# Patient Record
Sex: FEMALE | Race: ASIAN | NOT HISPANIC OR LATINO
[De-identification: names, ages, dates, MRNs, and addresses within clinical notes are randomized per-mention and may not be internally consistent; named-entity substitution may affect disease eponyms.]

---

## 2017-07-05 ENCOUNTER — TRANSCRIPTION ENCOUNTER (OUTPATIENT)
Age: 32
End: 2017-07-05

## 2022-05-16 ENCOUNTER — TRANSCRIPTION ENCOUNTER (OUTPATIENT)
Age: 37
End: 2022-05-16

## 2022-05-16 ENCOUNTER — INPATIENT (INPATIENT)
Facility: HOSPITAL | Age: 37
LOS: 3 days | Discharge: HOME | End: 2022-05-20
Attending: OBSTETRICS & GYNECOLOGY | Admitting: OBSTETRICS & GYNECOLOGY
Payer: COMMERCIAL

## 2022-05-16 VITALS
SYSTOLIC BLOOD PRESSURE: 173 MMHG | DIASTOLIC BLOOD PRESSURE: 93 MMHG | OXYGEN SATURATION: 99 % | TEMPERATURE: 98 F | RESPIRATION RATE: 18 BRPM | HEART RATE: 67 BPM

## 2022-05-16 DIAGNOSIS — Z3A.00 WEEKS OF GESTATION OF PREGNANCY NOT SPECIFIED: ICD-10-CM

## 2022-05-16 DIAGNOSIS — Z34.80 ENCOUNTER FOR SUPERVISION OF OTHER NORMAL PREGNANCY, UNSPECIFIED TRIMESTER: ICD-10-CM

## 2022-05-16 DIAGNOSIS — O26.899 OTHER SPECIFIED PREGNANCY RELATED CONDITIONS, UNSPECIFIED TRIMESTER: ICD-10-CM

## 2022-05-16 LAB
ALBUMIN SERPL ELPH-MCNC: 4.2 G/DL — SIGNIFICANT CHANGE UP (ref 3.3–5)
ALP SERPL-CCNC: 208 U/L — HIGH (ref 40–120)
ALT FLD-CCNC: 27 U/L — SIGNIFICANT CHANGE UP (ref 10–45)
ANION GAP SERPL CALC-SCNC: 14 MMOL/L — SIGNIFICANT CHANGE UP (ref 5–17)
APPEARANCE UR: CLEAR — SIGNIFICANT CHANGE UP
APTT BLD: 30.3 SEC — SIGNIFICANT CHANGE UP (ref 27.5–35.5)
AST SERPL-CCNC: 30 U/L — SIGNIFICANT CHANGE UP (ref 10–40)
BACTERIA # UR AUTO: NEGATIVE — SIGNIFICANT CHANGE UP
BASOPHILS # BLD AUTO: 0.07 K/UL — SIGNIFICANT CHANGE UP (ref 0–0.2)
BASOPHILS NFR BLD AUTO: 0.7 % — SIGNIFICANT CHANGE UP (ref 0–2)
BILIRUB SERPL-MCNC: 0.3 MG/DL — SIGNIFICANT CHANGE UP (ref 0.2–1.2)
BILIRUB UR-MCNC: NEGATIVE — SIGNIFICANT CHANGE UP
BLD GP AB SCN SERPL QL: NEGATIVE — SIGNIFICANT CHANGE UP
BUN SERPL-MCNC: 21 MG/DL — SIGNIFICANT CHANGE UP (ref 7–23)
CALCIUM SERPL-MCNC: 9.6 MG/DL — SIGNIFICANT CHANGE UP (ref 8.4–10.5)
CHLORIDE SERPL-SCNC: 98 MMOL/L — SIGNIFICANT CHANGE UP (ref 96–108)
CO2 SERPL-SCNC: 22 MMOL/L — SIGNIFICANT CHANGE UP (ref 22–31)
COLOR SPEC: COLORLESS — SIGNIFICANT CHANGE UP
CREAT ?TM UR-MCNC: 27 MG/DL — SIGNIFICANT CHANGE UP
CREAT SERPL-MCNC: 0.77 MG/DL — SIGNIFICANT CHANGE UP (ref 0.5–1.3)
DIFF PNL FLD: NEGATIVE — SIGNIFICANT CHANGE UP
EGFR: 103 ML/MIN/1.73M2 — SIGNIFICANT CHANGE UP
EOSINOPHIL # BLD AUTO: 0.07 K/UL — SIGNIFICANT CHANGE UP (ref 0–0.5)
EOSINOPHIL NFR BLD AUTO: 0.7 % — SIGNIFICANT CHANGE UP (ref 0–6)
EPI CELLS # UR: 3 /HPF — SIGNIFICANT CHANGE UP
FIBRINOGEN PPP-MCNC: 551 MG/DL — HIGH (ref 330–520)
GLUCOSE SERPL-MCNC: 81 MG/DL — SIGNIFICANT CHANGE UP (ref 70–99)
GLUCOSE UR QL: NEGATIVE — SIGNIFICANT CHANGE UP
HCT VFR BLD CALC: 38.4 % — SIGNIFICANT CHANGE UP (ref 34.5–45)
HGB BLD-MCNC: 13 G/DL — SIGNIFICANT CHANGE UP (ref 11.5–15.5)
HYALINE CASTS # UR AUTO: 0 /LPF — SIGNIFICANT CHANGE UP (ref 0–2)
IMM GRANULOCYTES NFR BLD AUTO: 0.3 % — SIGNIFICANT CHANGE UP (ref 0–1.5)
INR BLD: 0.83 RATIO — LOW (ref 0.88–1.16)
KETONES UR-MCNC: NEGATIVE — SIGNIFICANT CHANGE UP
LDH SERPL L TO P-CCNC: 245 U/L — HIGH (ref 50–242)
LEUKOCYTE ESTERASE UR-ACNC: ABNORMAL
LYMPHOCYTES # BLD AUTO: 3.86 K/UL — HIGH (ref 1–3.3)
LYMPHOCYTES # BLD AUTO: 37.4 % — SIGNIFICANT CHANGE UP (ref 13–44)
MCHC RBC-ENTMCNC: 33.1 PG — SIGNIFICANT CHANGE UP (ref 27–34)
MCHC RBC-ENTMCNC: 33.9 GM/DL — SIGNIFICANT CHANGE UP (ref 32–36)
MCV RBC AUTO: 97.7 FL — SIGNIFICANT CHANGE UP (ref 80–100)
MONOCYTES # BLD AUTO: 0.76 K/UL — SIGNIFICANT CHANGE UP (ref 0–0.9)
MONOCYTES NFR BLD AUTO: 7.4 % — SIGNIFICANT CHANGE UP (ref 2–14)
NEUTROPHILS # BLD AUTO: 5.54 K/UL — SIGNIFICANT CHANGE UP (ref 1.8–7.4)
NEUTROPHILS NFR BLD AUTO: 53.5 % — SIGNIFICANT CHANGE UP (ref 43–77)
NITRITE UR-MCNC: NEGATIVE — SIGNIFICANT CHANGE UP
NRBC # BLD: 0 /100 WBCS — SIGNIFICANT CHANGE UP (ref 0–0)
PH UR: 6.5 — SIGNIFICANT CHANGE UP (ref 5–8)
PLATELET # BLD AUTO: 202 K/UL — SIGNIFICANT CHANGE UP (ref 150–400)
POTASSIUM SERPL-MCNC: 4.8 MMOL/L — SIGNIFICANT CHANGE UP (ref 3.5–5.3)
POTASSIUM SERPL-SCNC: 4.8 MMOL/L — SIGNIFICANT CHANGE UP (ref 3.5–5.3)
PROT ?TM UR-MCNC: 22 MG/DL — HIGH (ref 0–12)
PROT SERPL-MCNC: 7.8 G/DL — SIGNIFICANT CHANGE UP (ref 6–8.3)
PROT UR-MCNC: ABNORMAL
PROT/CREAT UR-RTO: 0.8 RATIO — HIGH (ref 0–0.2)
PROTHROM AB SERPL-ACNC: 9.5 SEC — LOW (ref 10.5–13.4)
RBC # BLD: 3.93 M/UL — SIGNIFICANT CHANGE UP (ref 3.8–5.2)
RBC # FLD: 12.5 % — SIGNIFICANT CHANGE UP (ref 10.3–14.5)
RBC CASTS # UR COMP ASSIST: 1 /HPF — SIGNIFICANT CHANGE UP (ref 0–4)
RH IG SCN BLD-IMP: POSITIVE — SIGNIFICANT CHANGE UP
SARS-COV-2 RNA SPEC QL NAA+PROBE: SIGNIFICANT CHANGE UP
SODIUM SERPL-SCNC: 134 MMOL/L — LOW (ref 135–145)
SP GR SPEC: 1.01 — LOW (ref 1.01–1.02)
URATE SERPL-MCNC: 6.6 MG/DL — SIGNIFICANT CHANGE UP (ref 2.5–7)
UROBILINOGEN FLD QL: NEGATIVE — SIGNIFICANT CHANGE UP
WBC # BLD: 10.33 K/UL — SIGNIFICANT CHANGE UP (ref 3.8–10.5)
WBC # FLD AUTO: 10.33 K/UL — SIGNIFICANT CHANGE UP (ref 3.8–10.5)
WBC UR QL: 20 /HPF — HIGH (ref 0–5)

## 2022-05-16 RX ORDER — MAGNESIUM SULFATE 500 MG/ML
4 VIAL (ML) INJECTION ONCE
Refills: 0 | Status: COMPLETED | OUTPATIENT
Start: 2022-05-16 | End: 2022-05-16

## 2022-05-16 RX ORDER — CITRIC ACID/SODIUM CITRATE 300-500 MG
30 SOLUTION, ORAL ORAL ONCE
Refills: 0 | Status: DISCONTINUED | OUTPATIENT
Start: 2022-05-16 | End: 2022-05-17

## 2022-05-16 RX ORDER — OXYTOCIN 10 UNIT/ML
333.33 VIAL (ML) INJECTION
Qty: 20 | Refills: 0 | Status: DISCONTINUED | OUTPATIENT
Start: 2022-05-16 | End: 2022-05-17

## 2022-05-16 RX ORDER — MAGNESIUM SULFATE 500 MG/ML
4 VIAL (ML) INJECTION ONCE
Refills: 0 | Status: DISCONTINUED | OUTPATIENT
Start: 2022-05-16 | End: 2022-05-17

## 2022-05-16 RX ORDER — MAGNESIUM SULFATE 500 MG/ML
2 VIAL (ML) INJECTION
Qty: 40 | Refills: 0 | Status: DISCONTINUED | OUTPATIENT
Start: 2022-05-16 | End: 2022-05-17

## 2022-05-16 RX ORDER — NIFEDIPINE 30 MG
30 TABLET, EXTENDED RELEASE 24 HR ORAL DAILY
Refills: 0 | Status: DISCONTINUED | OUTPATIENT
Start: 2022-05-16 | End: 2022-05-20

## 2022-05-16 RX ORDER — SODIUM CHLORIDE 9 MG/ML
1000 INJECTION, SOLUTION INTRAVENOUS
Refills: 0 | Status: DISCONTINUED | OUTPATIENT
Start: 2022-05-16 | End: 2022-05-17

## 2022-05-16 RX ORDER — SODIUM CHLORIDE 9 MG/ML
1000 INJECTION, SOLUTION INTRAVENOUS ONCE
Refills: 0 | Status: COMPLETED | OUTPATIENT
Start: 2022-05-16 | End: 2022-05-16

## 2022-05-16 RX ORDER — LABETALOL HCL 100 MG
20 TABLET ORAL ONCE
Refills: 0 | Status: COMPLETED | OUTPATIENT
Start: 2022-05-16 | End: 2022-05-16

## 2022-05-16 RX ORDER — FAMOTIDINE 10 MG/ML
20 INJECTION INTRAVENOUS ONCE
Refills: 0 | Status: COMPLETED | OUTPATIENT
Start: 2022-05-16 | End: 2022-05-17

## 2022-05-16 RX ADMIN — Medication 20 MILLIGRAM(S): at 20:35

## 2022-05-16 RX ADMIN — Medication 50 GM/HR: at 21:42

## 2022-05-16 RX ADMIN — SODIUM CHLORIDE 2000 MILLILITER(S): 9 INJECTION, SOLUTION INTRAVENOUS at 20:30

## 2022-05-16 RX ADMIN — Medication 300 GRAM(S): at 21:22

## 2022-05-16 RX ADMIN — Medication 30 MILLIGRAM(S): at 21:32

## 2022-05-16 RX ADMIN — Medication 12 MILLIGRAM(S): at 21:47

## 2022-05-16 NOTE — OB PROVIDER H&P - NSHPPHYSICALEXAM_GEN_ALL_CORE
Gen: NAD  Cards: RRR  Pulm: CTAB  Abd: soft, non-tender, gravid  Ext: warm, well perfused    Cat 1 tracing  Cx none

## 2022-05-16 NOTE — OB PROVIDER H&P - HISTORY OF PRESENT ILLNESS
38yo  at 35w3d presents due to severe range BPs. PNC c/b IUGR <1%. Pt denies any BP issues through pregnancy. Today, she presented for routine ultrasound for biweekly BPP when elevated pressures were noted.   On admission patient had sustained BPs in 170s/90s, requiring a push of Lab20 IV. Patient denies chest pressure or pain, shortness of breath, fevers, chills, nausea, vomiting, diarrhea, blurry vision.   Patient reports a mild headache over her left temple that was present earlier this morning but currently no active.   Last sono baby was 1588g 3#8 at 34w. GBS unknown. Breech presentation.    OBHx: TOP 13 years ago, no surgery.  GYNHx: denies fibroids, cysts, abnormal paps, STDs  PMH: denies  PSH: denies  Meds: PNVs  All: NKDA  Soc: no substance use, anxiety/depression    accepts blood

## 2022-05-16 NOTE — OB PROVIDER H&P - ATTENDING COMMENTS
37yo nullip at 35.3, adm for high BP.  No sign PMHx.  APC compl by fetal growth restriction with efw yesterday on 1st%tile, 3'8".  Overnight BPs improved after one IV dose of Labetalol and one oral dose of Procardia. FHRT reassuring. Labs normal. Started on magnesium for seizure ppx, and given steroids for FLM.   As pt was not NPO, decision made to re-eval this morning and if delivery indicated, to plan for C/S for breech.  ISAIAH Shelby

## 2022-05-16 NOTE — OB PROVIDER H&P - ASSESSMENT
36yo  at 35w3d presents meeting criteria for preeclampsia with severe features:    - Admit to L&D for management of severe range blood pressure and surgical planning.  - s/p Lab 20 IV now BPs in 140s/80s. Start Procardia 30XL.  - HELLP labs q6hr + Start Magnesium for seizure ppx  - q6hr eval for mag toxicity  - BMZ for lunch maturity  - Breech presentation-> plan fo urgent  section    Amyeo Afroz Jereen, PGY-1  d/w Dr Shelby 38yo  at 35w3d presents meeting criteria for preeclampsia with severe features:    - Admit to L&D for management of severe range blood pressure and surgical planning.  - s/p Lab 20 IV now BPs in 140s/80s. Start Procardia 30XL.  - HELLP labs q6hr + Start Magnesium for seizure ppx  - q6hr eval for mag toxicity  - BMZ for lung maturity  - Breech presentation-> plan fo urgent  section    Amyeo Afroz Jereen, PGY-1  d/w Dr Shelby 36yo  at 35w3d presents meeting criteria for preeclampsia with severe features:    - Admit to L&D for management of severe range blood pressure and surgical planning.  - s/p Lab 20 IV now BPs in 140s/80s. Start Procardia 30XL.  - HELLP labs q6hr + Start Magnesium for seizure ppx  - q6hr eval for mag toxicity  - BMZ for lung maturity  - Breech presentation-> plan  section    Amyeo Afroz Jereen, PGY-1  d/w Dr Shelby

## 2022-05-17 LAB
ALBUMIN SERPL ELPH-MCNC: 3 G/DL — LOW (ref 3.3–5)
ALBUMIN SERPL ELPH-MCNC: 3.8 G/DL — SIGNIFICANT CHANGE UP (ref 3.3–5)
ALP SERPL-CCNC: 163 U/L — HIGH (ref 40–120)
ALP SERPL-CCNC: 195 U/L — HIGH (ref 40–120)
ALT FLD-CCNC: 25 U/L — SIGNIFICANT CHANGE UP (ref 10–45)
ALT FLD-CCNC: 26 U/L — SIGNIFICANT CHANGE UP (ref 10–45)
ANION GAP SERPL CALC-SCNC: 17 MMOL/L — SIGNIFICANT CHANGE UP (ref 5–17)
ANION GAP SERPL CALC-SCNC: 18 MMOL/L — HIGH (ref 5–17)
APTT BLD: 27.8 SEC — SIGNIFICANT CHANGE UP (ref 27.5–35.5)
APTT BLD: 29.6 SEC — SIGNIFICANT CHANGE UP (ref 27.5–35.5)
AST SERPL-CCNC: 26 U/L — SIGNIFICANT CHANGE UP (ref 10–40)
AST SERPL-CCNC: 27 U/L — SIGNIFICANT CHANGE UP (ref 10–40)
BASOPHILS # BLD AUTO: 0.01 K/UL — SIGNIFICANT CHANGE UP (ref 0–0.2)
BASOPHILS # BLD AUTO: 0.03 K/UL — SIGNIFICANT CHANGE UP (ref 0–0.2)
BASOPHILS NFR BLD AUTO: 0.1 % — SIGNIFICANT CHANGE UP (ref 0–2)
BASOPHILS NFR BLD AUTO: 0.3 % — SIGNIFICANT CHANGE UP (ref 0–2)
BILIRUB SERPL-MCNC: 0.2 MG/DL — SIGNIFICANT CHANGE UP (ref 0.2–1.2)
BILIRUB SERPL-MCNC: 0.2 MG/DL — SIGNIFICANT CHANGE UP (ref 0.2–1.2)
BUN SERPL-MCNC: 17 MG/DL — SIGNIFICANT CHANGE UP (ref 7–23)
BUN SERPL-MCNC: 17 MG/DL — SIGNIFICANT CHANGE UP (ref 7–23)
CALCIUM SERPL-MCNC: 7.4 MG/DL — LOW (ref 8.4–10.5)
CALCIUM SERPL-MCNC: 8.1 MG/DL — LOW (ref 8.4–10.5)
CHLORIDE SERPL-SCNC: 101 MMOL/L — SIGNIFICANT CHANGE UP (ref 96–108)
CHLORIDE SERPL-SCNC: 98 MMOL/L — SIGNIFICANT CHANGE UP (ref 96–108)
CO2 SERPL-SCNC: 16 MMOL/L — LOW (ref 22–31)
CO2 SERPL-SCNC: 19 MMOL/L — LOW (ref 22–31)
COVID-19 SPIKE DOMAIN AB INTERP: POSITIVE
COVID-19 SPIKE DOMAIN AB INTERP: POSITIVE
COVID-19 SPIKE DOMAIN ANTIBODY RESULT: >250 U/ML — HIGH
COVID-19 SPIKE DOMAIN ANTIBODY RESULT: >250 U/ML — HIGH
CREAT SERPL-MCNC: 0.69 MG/DL — SIGNIFICANT CHANGE UP (ref 0.5–1.3)
CREAT SERPL-MCNC: 0.69 MG/DL — SIGNIFICANT CHANGE UP (ref 0.5–1.3)
EGFR: 116 ML/MIN/1.73M2 — SIGNIFICANT CHANGE UP
EGFR: 116 ML/MIN/1.73M2 — SIGNIFICANT CHANGE UP
EOSINOPHIL # BLD AUTO: 0 K/UL — SIGNIFICANT CHANGE UP (ref 0–0.5)
EOSINOPHIL # BLD AUTO: 0 K/UL — SIGNIFICANT CHANGE UP (ref 0–0.5)
EOSINOPHIL NFR BLD AUTO: 0 % — SIGNIFICANT CHANGE UP (ref 0–6)
EOSINOPHIL NFR BLD AUTO: 0 % — SIGNIFICANT CHANGE UP (ref 0–6)
FIBRINOGEN PPP-MCNC: 472 MG/DL — SIGNIFICANT CHANGE UP (ref 330–520)
FIBRINOGEN PPP-MCNC: 518 MG/DL — SIGNIFICANT CHANGE UP (ref 330–520)
GLUCOSE SERPL-MCNC: 133 MG/DL — HIGH (ref 70–99)
GLUCOSE SERPL-MCNC: 172 MG/DL — HIGH (ref 70–99)
HCT VFR BLD CALC: 32.5 % — LOW (ref 34.5–45)
HCT VFR BLD CALC: 35.6 % — SIGNIFICANT CHANGE UP (ref 34.5–45)
HGB BLD-MCNC: 10.8 G/DL — LOW (ref 11.5–15.5)
HGB BLD-MCNC: 12.3 G/DL — SIGNIFICANT CHANGE UP (ref 11.5–15.5)
IMM GRANULOCYTES NFR BLD AUTO: 0.5 % — SIGNIFICANT CHANGE UP (ref 0–1.5)
IMM GRANULOCYTES NFR BLD AUTO: 0.6 % — SIGNIFICANT CHANGE UP (ref 0–1.5)
INR BLD: 0.82 RATIO — LOW (ref 0.88–1.16)
INR BLD: 0.85 RATIO — LOW (ref 0.88–1.16)
LDH SERPL L TO P-CCNC: 194 U/L — SIGNIFICANT CHANGE UP (ref 50–242)
LDH SERPL L TO P-CCNC: 208 U/L — SIGNIFICANT CHANGE UP (ref 50–242)
LYMPHOCYTES # BLD AUTO: 1.26 K/UL — SIGNIFICANT CHANGE UP (ref 1–3.3)
LYMPHOCYTES # BLD AUTO: 1.85 K/UL — SIGNIFICANT CHANGE UP (ref 1–3.3)
LYMPHOCYTES # BLD AUTO: 11.3 % — LOW (ref 13–44)
LYMPHOCYTES # BLD AUTO: 14 % — SIGNIFICANT CHANGE UP (ref 13–44)
MAGNESIUM SERPL-MCNC: 5.8 MG/DL — HIGH (ref 1.6–2.6)
MAGNESIUM SERPL-MCNC: 7 MG/DL — CRITICAL HIGH (ref 1.6–2.6)
MAGNESIUM SERPL-MCNC: 7.4 MG/DL — CRITICAL HIGH (ref 1.6–2.6)
MCHC RBC-ENTMCNC: 33.1 PG — SIGNIFICANT CHANGE UP (ref 27–34)
MCHC RBC-ENTMCNC: 33.2 GM/DL — SIGNIFICANT CHANGE UP (ref 32–36)
MCHC RBC-ENTMCNC: 33.4 PG — SIGNIFICANT CHANGE UP (ref 27–34)
MCHC RBC-ENTMCNC: 34.6 GM/DL — SIGNIFICANT CHANGE UP (ref 32–36)
MCV RBC AUTO: 96.7 FL — SIGNIFICANT CHANGE UP (ref 80–100)
MCV RBC AUTO: 99.7 FL — SIGNIFICANT CHANGE UP (ref 80–100)
MONOCYTES # BLD AUTO: 0.05 K/UL — SIGNIFICANT CHANGE UP (ref 0–0.9)
MONOCYTES # BLD AUTO: 0.46 K/UL — SIGNIFICANT CHANGE UP (ref 0–0.9)
MONOCYTES NFR BLD AUTO: 0.4 % — LOW (ref 2–14)
MONOCYTES NFR BLD AUTO: 3.5 % — SIGNIFICANT CHANGE UP (ref 2–14)
NEUTROPHILS # BLD AUTO: 10.81 K/UL — HIGH (ref 1.8–7.4)
NEUTROPHILS # BLD AUTO: 9.75 K/UL — HIGH (ref 1.8–7.4)
NEUTROPHILS NFR BLD AUTO: 81.9 % — HIGH (ref 43–77)
NEUTROPHILS NFR BLD AUTO: 87.4 % — HIGH (ref 43–77)
NRBC # BLD: 0 /100 WBCS — SIGNIFICANT CHANGE UP (ref 0–0)
NRBC # BLD: 0 /100 WBCS — SIGNIFICANT CHANGE UP (ref 0–0)
PLATELET # BLD AUTO: 175 K/UL — SIGNIFICANT CHANGE UP (ref 150–400)
PLATELET # BLD AUTO: 190 K/UL — SIGNIFICANT CHANGE UP (ref 150–400)
POTASSIUM SERPL-MCNC: 4.4 MMOL/L — SIGNIFICANT CHANGE UP (ref 3.5–5.3)
POTASSIUM SERPL-MCNC: 4.4 MMOL/L — SIGNIFICANT CHANGE UP (ref 3.5–5.3)
POTASSIUM SERPL-SCNC: 4.4 MMOL/L — SIGNIFICANT CHANGE UP (ref 3.5–5.3)
POTASSIUM SERPL-SCNC: 4.4 MMOL/L — SIGNIFICANT CHANGE UP (ref 3.5–5.3)
PROT SERPL-MCNC: 6 G/DL — SIGNIFICANT CHANGE UP (ref 6–8.3)
PROT SERPL-MCNC: 6.9 G/DL — SIGNIFICANT CHANGE UP (ref 6–8.3)
PROTHROM AB SERPL-ACNC: 9.4 SEC — LOW (ref 10.5–13.4)
PROTHROM AB SERPL-ACNC: 9.9 SEC — LOW (ref 10.5–13.4)
RBC # BLD: 3.26 M/UL — LOW (ref 3.8–5.2)
RBC # BLD: 3.68 M/UL — LOW (ref 3.8–5.2)
RBC # FLD: 12.3 % — SIGNIFICANT CHANGE UP (ref 10.3–14.5)
RBC # FLD: 12.7 % — SIGNIFICANT CHANGE UP (ref 10.3–14.5)
SARS-COV-2 IGG+IGM SERPL QL IA: >250 U/ML — HIGH
SARS-COV-2 IGG+IGM SERPL QL IA: >250 U/ML — HIGH
SARS-COV-2 IGG+IGM SERPL QL IA: POSITIVE
SARS-COV-2 IGG+IGM SERPL QL IA: POSITIVE
SODIUM SERPL-SCNC: 134 MMOL/L — LOW (ref 135–145)
SODIUM SERPL-SCNC: 135 MMOL/L — SIGNIFICANT CHANGE UP (ref 135–145)
T PALLIDUM AB TITR SER: NEGATIVE — SIGNIFICANT CHANGE UP
URATE SERPL-MCNC: 7 MG/DL — SIGNIFICANT CHANGE UP (ref 2.5–7)
URATE SERPL-MCNC: 7 MG/DL — SIGNIFICANT CHANGE UP (ref 2.5–7)
WBC # BLD: 11.16 K/UL — HIGH (ref 3.8–10.5)
WBC # BLD: 13.2 K/UL — HIGH (ref 3.8–10.5)
WBC # FLD AUTO: 11.16 K/UL — HIGH (ref 3.8–10.5)
WBC # FLD AUTO: 13.2 K/UL — HIGH (ref 3.8–10.5)

## 2022-05-17 PROCEDURE — 88307 TISSUE EXAM BY PATHOLOGIST: CPT | Mod: 26

## 2022-05-17 RX ORDER — NALOXONE HYDROCHLORIDE 4 MG/.1ML
0.1 SPRAY NASAL
Refills: 0 | Status: DISCONTINUED | OUTPATIENT
Start: 2022-05-17 | End: 2022-05-18

## 2022-05-17 RX ORDER — DIPHENHYDRAMINE HCL 50 MG
25 CAPSULE ORAL EVERY 6 HOURS
Refills: 0 | Status: DISCONTINUED | OUTPATIENT
Start: 2022-05-17 | End: 2022-05-20

## 2022-05-17 RX ORDER — SIMETHICONE 80 MG/1
80 TABLET, CHEWABLE ORAL EVERY 4 HOURS
Refills: 0 | Status: DISCONTINUED | OUTPATIENT
Start: 2022-05-17 | End: 2022-05-20

## 2022-05-17 RX ORDER — TETANUS TOXOID, REDUCED DIPHTHERIA TOXOID AND ACELLULAR PERTUSSIS VACCINE, ADSORBED 5; 2.5; 8; 8; 2.5 [IU]/.5ML; [IU]/.5ML; UG/.5ML; UG/.5ML; UG/.5ML
0.5 SUSPENSION INTRAMUSCULAR ONCE
Refills: 0 | Status: DISCONTINUED | OUTPATIENT
Start: 2022-05-17 | End: 2022-05-20

## 2022-05-17 RX ORDER — KETOROLAC TROMETHAMINE 30 MG/ML
30 SYRINGE (ML) INJECTION EVERY 6 HOURS
Refills: 0 | Status: DISCONTINUED | OUTPATIENT
Start: 2022-05-17 | End: 2022-05-18

## 2022-05-17 RX ORDER — MORPHINE SULFATE 50 MG/1
0.1 CAPSULE, EXTENDED RELEASE ORAL ONCE
Refills: 0 | Status: DISCONTINUED | OUTPATIENT
Start: 2022-05-17 | End: 2022-05-18

## 2022-05-17 RX ORDER — OXYCODONE HYDROCHLORIDE 5 MG/1
5 TABLET ORAL
Refills: 0 | Status: DISCONTINUED | OUTPATIENT
Start: 2022-05-17 | End: 2022-05-18

## 2022-05-17 RX ORDER — NALBUPHINE HYDROCHLORIDE 10 MG/ML
2.5 INJECTION, SOLUTION INTRAMUSCULAR; INTRAVENOUS; SUBCUTANEOUS EVERY 6 HOURS
Refills: 0 | Status: DISCONTINUED | OUTPATIENT
Start: 2022-05-17 | End: 2022-05-18

## 2022-05-17 RX ORDER — CITRIC ACID/SODIUM CITRATE 300-500 MG
15 SOLUTION, ORAL ORAL ONCE
Refills: 0 | Status: COMPLETED | OUTPATIENT
Start: 2022-05-17 | End: 2022-05-17

## 2022-05-17 RX ORDER — MAGNESIUM SULFATE 500 MG/ML
2 VIAL (ML) INJECTION
Qty: 40 | Refills: 0 | Status: DISCONTINUED | OUTPATIENT
Start: 2022-05-17 | End: 2022-05-17

## 2022-05-17 RX ORDER — IBUPROFEN 200 MG
600 TABLET ORAL EVERY 6 HOURS
Refills: 0 | Status: COMPLETED | OUTPATIENT
Start: 2022-05-17 | End: 2023-04-15

## 2022-05-17 RX ORDER — SODIUM CHLORIDE 9 MG/ML
1000 INJECTION, SOLUTION INTRAVENOUS
Refills: 0 | Status: DISCONTINUED | OUTPATIENT
Start: 2022-05-17 | End: 2022-05-18

## 2022-05-17 RX ORDER — ACETAMINOPHEN 500 MG
975 TABLET ORAL
Refills: 0 | Status: DISCONTINUED | OUTPATIENT
Start: 2022-05-17 | End: 2022-05-20

## 2022-05-17 RX ORDER — LANOLIN
1 OINTMENT (GRAM) TOPICAL EVERY 6 HOURS
Refills: 0 | Status: DISCONTINUED | OUTPATIENT
Start: 2022-05-17 | End: 2022-05-20

## 2022-05-17 RX ORDER — OXYCODONE HYDROCHLORIDE 5 MG/1
5 TABLET ORAL
Refills: 0 | Status: COMPLETED | OUTPATIENT
Start: 2022-05-17 | End: 2022-05-24

## 2022-05-17 RX ORDER — ACETAMINOPHEN 500 MG
975 TABLET ORAL ONCE
Refills: 0 | Status: DISCONTINUED | OUTPATIENT
Start: 2022-05-17 | End: 2022-05-20

## 2022-05-17 RX ORDER — OXYTOCIN 10 UNIT/ML
333.33 VIAL (ML) INJECTION
Qty: 20 | Refills: 0 | Status: DISCONTINUED | OUTPATIENT
Start: 2022-05-17 | End: 2022-05-20

## 2022-05-17 RX ORDER — ONDANSETRON 8 MG/1
4 TABLET, FILM COATED ORAL EVERY 6 HOURS
Refills: 0 | Status: COMPLETED | OUTPATIENT
Start: 2022-05-17 | End: 2022-05-18

## 2022-05-17 RX ORDER — OXYCODONE HYDROCHLORIDE 5 MG/1
5 TABLET ORAL ONCE
Refills: 0 | Status: DISCONTINUED | OUTPATIENT
Start: 2022-05-17 | End: 2022-05-20

## 2022-05-17 RX ORDER — HEPARIN SODIUM 5000 [USP'U]/ML
5000 INJECTION INTRAVENOUS; SUBCUTANEOUS EVERY 12 HOURS
Refills: 0 | Status: DISCONTINUED | OUTPATIENT
Start: 2022-05-17 | End: 2022-05-20

## 2022-05-17 RX ORDER — DEXAMETHASONE 0.5 MG/5ML
4 ELIXIR ORAL EVERY 6 HOURS
Refills: 0 | Status: DISCONTINUED | OUTPATIENT
Start: 2022-05-17 | End: 2022-05-18

## 2022-05-17 RX ORDER — MAGNESIUM SULFATE 500 MG/ML
1.5 VIAL (ML) INJECTION
Qty: 40 | Refills: 0 | Status: DISCONTINUED | OUTPATIENT
Start: 2022-05-17 | End: 2022-05-18

## 2022-05-17 RX ORDER — MAGNESIUM HYDROXIDE 400 MG/1
30 TABLET, CHEWABLE ORAL
Refills: 0 | Status: DISCONTINUED | OUTPATIENT
Start: 2022-05-17 | End: 2022-05-20

## 2022-05-17 RX ADMIN — NALBUPHINE HYDROCHLORIDE 2.5 MILLIGRAM(S): 10 INJECTION, SOLUTION INTRAMUSCULAR; INTRAVENOUS; SUBCUTANEOUS at 12:00

## 2022-05-17 RX ADMIN — Medication 975 MILLIGRAM(S): at 21:00

## 2022-05-17 RX ADMIN — Medication 15 MILLILITER(S): at 07:29

## 2022-05-17 RX ADMIN — Medication 975 MILLIGRAM(S): at 22:00

## 2022-05-17 RX ADMIN — Medication 30 MILLIGRAM(S): at 17:32

## 2022-05-17 RX ADMIN — Medication 30 MILLIGRAM(S): at 13:33

## 2022-05-17 RX ADMIN — ONDANSETRON 4 MILLIGRAM(S): 8 TABLET, FILM COATED ORAL at 16:58

## 2022-05-17 RX ADMIN — HEPARIN SODIUM 5000 UNIT(S): 5000 INJECTION INTRAVENOUS; SUBCUTANEOUS at 17:30

## 2022-05-17 RX ADMIN — FAMOTIDINE 20 MILLIGRAM(S): 10 INJECTION INTRAVENOUS at 07:30

## 2022-05-17 RX ADMIN — Medication 30 MILLIGRAM(S): at 23:03

## 2022-05-17 RX ADMIN — Medication 37.5 GM/HR: at 23:03

## 2022-05-17 RX ADMIN — Medication 1000 MILLIUNIT(S)/MIN: at 09:20

## 2022-05-17 RX ADMIN — Medication 4 MILLIGRAM(S): at 19:30

## 2022-05-17 RX ADMIN — Medication 30 MILLIGRAM(S): at 09:42

## 2022-05-17 RX ADMIN — Medication 975 MILLIGRAM(S): at 12:44

## 2022-05-17 NOTE — OB PROVIDER DELIVERY SUMMARY - NSPROVIDERDELIVERYNOTE_OBGYN_ALL_OB_FT
primary LTCS@35w4d for sPEC, uncomplicated  viable female infant, breech presentation, 3lbs 12oz, Apgars 9/9, cord gasses sent  Grossly normal fallopian tubes, uterus. R ovary notable for small simple appearing cyst. Left ovary wnl.   Hysterotomy closed in 1 layer    EBL: 700  QBL: 140  IVF: 1800  UOP: 100    Suha PGY2

## 2022-05-17 NOTE — CHART NOTE - NSCHARTNOTEFT_GEN_A_CORE
HELLP labs on admission reviewed: wnl. P/C 0.8.  Cont q6hr HELLP labs. Pt assessed at 2am, no complaints.  Reflexes 1+, same compared to baseline.    overnight since admission at 9:00pm.    Amyeo Afroz Jereen, PGY-1
Patient is a 36 yo  at 35.4 wks gestational age presenting with elevated blood pressures requiring push of labetalol 20 mg, meeting criteria for sPEC.  She was started on magnesium sulfate for seizure prophylaxis on .  She received her first dose of BMZ on .  Case discussed with MFM on proceeding with immediate delivery due to sPEC vs. awaiting beta complete status.  Indicated immediately delivery, due not defer for steroids.    d/w Dr. Melvin Bentley PGY3
chart and events reviewed  36yo P0 at 35+wks a/w sPEC & IUGR, breech presentation s/p BMZ x1 and on Mg  plan for delivery this am, NPO  stable condition, reassuring fetal status  explained decision to pt and , all questions answered  discussed as well poss need to T incision the uterus or decide to go straight to classical c/s depending on how well developed NEMESIO is/IUGR baby.    they understand in that even need for c/s in the future.  consents obtained  anesthesia and nursing aware  awaiting OR  calluzzo
Mg level @7.4.  VSS.  No signs/symptoms of mg toxicity.     Will restart in 2 hours    Blaine PGY2
Pt evaluated at bedside following serum Mag level 7.0 & ROS notable for dizziness & n/v. Mag was paused followed critical Mag value, pt reports alleviation of symptoms since then. Denies any headache, SOB, CP, or dizziness. VSS as below. Mag to be held for 2h and then restarted at lower rate.    Vital Signs Last 24 Hrs  T(C): 36.4 (17 May 2022 19:00), Max: 37.1 (17 May 2022 03:04)  T(F): 97.6 (17 May 2022 19:00), Max: 98.78 (17 May 2022 03:04)  HR: 72 (17 May 2022 21:00) (62 - 148)  BP: 124/87 (17 May 2022 21:00) (102/55 - 156/93)  BP(mean): 92 (17 May 2022 11:50) (76 - 95)  RR: 18 (17 May 2022 21:00) (16 - 20)  SpO2: 98% (17 May 2022 21:00) (94% - 100%)    Patient status and plan of care discussed with Dr. Huston.     Elena Richardson MD  OBGYN PGY3

## 2022-05-17 NOTE — OB RN INTRAOPERATIVE NOTE - NSSELHIDDEN_OBGYN_ALL_OB_FT
[NS_DeliveryAttending1_OBGYN_ALL_OB_FT:[id number]==],[NS_DeliveryAssist1_OBGYN_ALL_OB_FT:[email or web address]],[NS_DeliveryRN_OBGYN_ALL_OB_FT:[id number]==]

## 2022-05-17 NOTE — PROVIDER CONTACT NOTE (CRITICAL VALUE NOTIFICATION) - SITUATION
C/S day of delivery on mg for PEC. C/o dizziness w/ standing, nausea, 2x episodes of emesis relieved with decadron and zofran admin

## 2022-05-17 NOTE — OB NEONATOLOGY/PEDIATRICIAN DELIVERY SUMMARY - NSPEDSNEONOTESA_OBGYN_ALL_OB_FT
Requested by OB to attend a primary  for Breech presentation at 35 4/7 wks.  Mom is a 34yo , GBS unknown and prenatal labs negative, covid negative. and blood type A+.  Not in labor and no rupture. Hx of gestational hypertension, HELLP labs normal. 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 On no medication. Magnesium level 7.4 at 0500. Magnesium stopped  then and restarted at 0730. AROM/clear at time of delivery.  Apgars 9 and 9. Updated parents.    Small bruise noted in middle of back. Mother wishes to breastfeed. Infant transferred to NICU for further management. Requested by OB to attend a primary  for Breech presentation at 35 4/7 wks.  Mom is a 36yo , GBS unknown and prenatal labs negative, covid negative. and blood type A+.  Not in labor and no rupture. Hx of gestational hypertension, HELLP labs normal. 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 On no medication. Magnesium level 7.4 at 0500. Magnesium stopped  then and restarted at 0730. AROM/clear at time of delivery. Delayed cord clamping 30 sec. Apgars 9 and 9. Updated parents.    Small bruise noted in middle of back. Mother wishes to breastfeed. Infant transferred to NICU for further management.

## 2022-05-17 NOTE — PRE-ANESTHESIA EVALUATION ADULT - NSANTHDIETYNSD_GEN_ALL_CORE
11/12/19 1600   Psycho Education   Type of Intervention structured groups   Response participates with encouragement   Hours 1   Treatment Detail Dual   Pt was present in group, engaged with prompts from writer. Did not present.    Yes

## 2022-05-17 NOTE — PROVIDER CONTACT NOTE (CRITICAL VALUE NOTIFICATION) - ASSESSMENT
Denies HA, RUQ pain, vision changes, CP, SOB. Nausea now resolved s/p decadron and zofran. C/o occasional dizziness. Lungs CTA B/L, DTRS +2 B/L

## 2022-05-18 LAB
ALBUMIN SERPL ELPH-MCNC: 3.2 G/DL — LOW (ref 3.3–5)
ALP SERPL-CCNC: 140 U/L — HIGH (ref 40–120)
ALT FLD-CCNC: 24 U/L — SIGNIFICANT CHANGE UP (ref 10–45)
ANION GAP SERPL CALC-SCNC: 13 MMOL/L — SIGNIFICANT CHANGE UP (ref 5–17)
APTT BLD: 26.7 SEC — LOW (ref 27.5–35.5)
AST SERPL-CCNC: 26 U/L — SIGNIFICANT CHANGE UP (ref 10–40)
BASOPHILS # BLD AUTO: 0.01 K/UL — SIGNIFICANT CHANGE UP (ref 0–0.2)
BASOPHILS NFR BLD AUTO: 0.1 % — SIGNIFICANT CHANGE UP (ref 0–2)
BILIRUB SERPL-MCNC: 0.2 MG/DL — SIGNIFICANT CHANGE UP (ref 0.2–1.2)
BUN SERPL-MCNC: 12 MG/DL — SIGNIFICANT CHANGE UP (ref 7–23)
CALCIUM SERPL-MCNC: 6.6 MG/DL — LOW (ref 8.4–10.5)
CHLORIDE SERPL-SCNC: 99 MMOL/L — SIGNIFICANT CHANGE UP (ref 96–108)
CO2 SERPL-SCNC: 23 MMOL/L — SIGNIFICANT CHANGE UP (ref 22–31)
CREAT SERPL-MCNC: 0.67 MG/DL — SIGNIFICANT CHANGE UP (ref 0.5–1.3)
EGFR: 117 ML/MIN/1.73M2 — SIGNIFICANT CHANGE UP
EOSINOPHIL # BLD AUTO: 0 K/UL — SIGNIFICANT CHANGE UP (ref 0–0.5)
EOSINOPHIL NFR BLD AUTO: 0 % — SIGNIFICANT CHANGE UP (ref 0–6)
FIBRINOGEN PPP-MCNC: 473 MG/DL — SIGNIFICANT CHANGE UP (ref 330–520)
GLUCOSE SERPL-MCNC: 119 MG/DL — HIGH (ref 70–99)
HCT VFR BLD CALC: 32.1 % — LOW (ref 34.5–45)
HGB BLD-MCNC: 10.6 G/DL — LOW (ref 11.5–15.5)
IMM GRANULOCYTES NFR BLD AUTO: 0.9 % — SIGNIFICANT CHANGE UP (ref 0–1.5)
INR BLD: 0.8 RATIO — LOW (ref 0.88–1.16)
LDH SERPL L TO P-CCNC: 264 U/L — HIGH (ref 50–242)
LYMPHOCYTES # BLD AUTO: 1.9 K/UL — SIGNIFICANT CHANGE UP (ref 1–3.3)
LYMPHOCYTES # BLD AUTO: 10.2 % — LOW (ref 13–44)
MAGNESIUM SERPL-MCNC: 7 MG/DL — CRITICAL HIGH (ref 1.6–2.6)
MCHC RBC-ENTMCNC: 32.5 PG — SIGNIFICANT CHANGE UP (ref 27–34)
MCHC RBC-ENTMCNC: 33 GM/DL — SIGNIFICANT CHANGE UP (ref 32–36)
MCV RBC AUTO: 98.5 FL — SIGNIFICANT CHANGE UP (ref 80–100)
MONOCYTES # BLD AUTO: 1.02 K/UL — HIGH (ref 0–0.9)
MONOCYTES NFR BLD AUTO: 5.5 % — SIGNIFICANT CHANGE UP (ref 2–14)
NEUTROPHILS # BLD AUTO: 15.54 K/UL — HIGH (ref 1.8–7.4)
NEUTROPHILS NFR BLD AUTO: 83.3 % — HIGH (ref 43–77)
NRBC # BLD: 0 /100 WBCS — SIGNIFICANT CHANGE UP (ref 0–0)
PLATELET # BLD AUTO: 161 K/UL — SIGNIFICANT CHANGE UP (ref 150–400)
POTASSIUM SERPL-MCNC: 5.1 MMOL/L — SIGNIFICANT CHANGE UP (ref 3.5–5.3)
POTASSIUM SERPL-SCNC: 5.1 MMOL/L — SIGNIFICANT CHANGE UP (ref 3.5–5.3)
PROT SERPL-MCNC: 5.8 G/DL — LOW (ref 6–8.3)
PROTHROM AB SERPL-ACNC: 9.3 SEC — LOW (ref 10.5–13.4)
RBC # BLD: 3.26 M/UL — LOW (ref 3.8–5.2)
RBC # FLD: 12.7 % — SIGNIFICANT CHANGE UP (ref 10.3–14.5)
SODIUM SERPL-SCNC: 135 MMOL/L — SIGNIFICANT CHANGE UP (ref 135–145)
URATE SERPL-MCNC: 6 MG/DL — SIGNIFICANT CHANGE UP (ref 2.5–7)
WBC # BLD: 18.63 K/UL — HIGH (ref 3.8–10.5)
WBC # FLD AUTO: 18.63 K/UL — HIGH (ref 3.8–10.5)

## 2022-05-18 RX ORDER — IBUPROFEN 200 MG
600 TABLET ORAL EVERY 6 HOURS
Refills: 0 | Status: DISCONTINUED | OUTPATIENT
Start: 2022-05-18 | End: 2022-05-20

## 2022-05-18 RX ORDER — OXYCODONE HYDROCHLORIDE 5 MG/1
5 TABLET ORAL
Refills: 0 | Status: DISCONTINUED | OUTPATIENT
Start: 2022-05-18 | End: 2022-05-20

## 2022-05-18 RX ADMIN — NALBUPHINE HYDROCHLORIDE 2.5 MILLIGRAM(S): 10 INJECTION, SOLUTION INTRAMUSCULAR; INTRAVENOUS; SUBCUTANEOUS at 03:15

## 2022-05-18 RX ADMIN — ONDANSETRON 4 MILLIGRAM(S): 8 TABLET, FILM COATED ORAL at 06:38

## 2022-05-18 RX ADMIN — Medication 975 MILLIGRAM(S): at 02:40

## 2022-05-18 RX ADMIN — Medication 975 MILLIGRAM(S): at 00:40

## 2022-05-18 RX ADMIN — HEPARIN SODIUM 5000 UNIT(S): 5000 INJECTION INTRAVENOUS; SUBCUTANEOUS at 05:16

## 2022-05-18 RX ADMIN — Medication 30 MILLIGRAM(S): at 00:15

## 2022-05-18 RX ADMIN — NALBUPHINE HYDROCHLORIDE 2.5 MILLIGRAM(S): 10 INJECTION, SOLUTION INTRAMUSCULAR; INTRAVENOUS; SUBCUTANEOUS at 02:50

## 2022-05-18 RX ADMIN — Medication 30 MILLIGRAM(S): at 17:29

## 2022-05-18 RX ADMIN — Medication 975 MILLIGRAM(S): at 21:30

## 2022-05-18 RX ADMIN — Medication 975 MILLIGRAM(S): at 16:25

## 2022-05-18 RX ADMIN — Medication 975 MILLIGRAM(S): at 10:11

## 2022-05-18 RX ADMIN — Medication 975 MILLIGRAM(S): at 15:25

## 2022-05-18 RX ADMIN — Medication 600 MILLIGRAM(S): at 13:30

## 2022-05-18 RX ADMIN — Medication 600 MILLIGRAM(S): at 12:23

## 2022-05-18 RX ADMIN — Medication 975 MILLIGRAM(S): at 08:58

## 2022-05-18 RX ADMIN — Medication 975 MILLIGRAM(S): at 21:00

## 2022-05-18 RX ADMIN — Medication 600 MILLIGRAM(S): at 23:18

## 2022-05-18 RX ADMIN — Medication 30 MILLIGRAM(S): at 05:16

## 2022-05-18 RX ADMIN — Medication 600 MILLIGRAM(S): at 17:29

## 2022-05-18 RX ADMIN — Medication 600 MILLIGRAM(S): at 18:12

## 2022-05-18 RX ADMIN — HEPARIN SODIUM 5000 UNIT(S): 5000 INJECTION INTRAVENOUS; SUBCUTANEOUS at 17:30

## 2022-05-19 LAB
ALBUMIN SERPL ELPH-MCNC: 2.9 G/DL — LOW (ref 3.3–5)
ALP SERPL-CCNC: 119 U/L — SIGNIFICANT CHANGE UP (ref 40–120)
ALT FLD-CCNC: 17 U/L — SIGNIFICANT CHANGE UP (ref 10–45)
ANION GAP SERPL CALC-SCNC: 10 MMOL/L — SIGNIFICANT CHANGE UP (ref 5–17)
APTT BLD: 37.3 SEC — HIGH (ref 27.5–35.5)
AST SERPL-CCNC: 25 U/L — SIGNIFICANT CHANGE UP (ref 10–40)
BASOPHILS # BLD AUTO: 0.04 K/UL — SIGNIFICANT CHANGE UP (ref 0–0.2)
BASOPHILS NFR BLD AUTO: 0.3 % — SIGNIFICANT CHANGE UP (ref 0–2)
BILIRUB SERPL-MCNC: 0.2 MG/DL — SIGNIFICANT CHANGE UP (ref 0.2–1.2)
BUN SERPL-MCNC: 16 MG/DL — SIGNIFICANT CHANGE UP (ref 7–23)
CALCIUM SERPL-MCNC: 7.3 MG/DL — LOW (ref 8.4–10.5)
CHLORIDE SERPL-SCNC: 102 MMOL/L — SIGNIFICANT CHANGE UP (ref 96–108)
CO2 SERPL-SCNC: 24 MMOL/L — SIGNIFICANT CHANGE UP (ref 22–31)
CREAT SERPL-MCNC: 0.68 MG/DL — SIGNIFICANT CHANGE UP (ref 0.5–1.3)
EGFR: 116 ML/MIN/1.73M2 — SIGNIFICANT CHANGE UP
EOSINOPHIL # BLD AUTO: 0.01 K/UL — SIGNIFICANT CHANGE UP (ref 0–0.5)
EOSINOPHIL NFR BLD AUTO: 0.1 % — SIGNIFICANT CHANGE UP (ref 0–6)
FIBRINOGEN PPP-MCNC: 488 MG/DL — SIGNIFICANT CHANGE UP (ref 330–520)
GLUCOSE SERPL-MCNC: 76 MG/DL — SIGNIFICANT CHANGE UP (ref 70–99)
HCT VFR BLD CALC: 28.4 % — LOW (ref 34.5–45)
HGB BLD-MCNC: 9.5 G/DL — LOW (ref 11.5–15.5)
IMM GRANULOCYTES NFR BLD AUTO: 0.6 % — SIGNIFICANT CHANGE UP (ref 0–1.5)
INR BLD: 0.82 RATIO — LOW (ref 0.88–1.16)
LDH SERPL L TO P-CCNC: 258 U/L — HIGH (ref 50–242)
LYMPHOCYTES # BLD AUTO: 21.9 % — SIGNIFICANT CHANGE UP (ref 13–44)
LYMPHOCYTES # BLD AUTO: 3.29 K/UL — SIGNIFICANT CHANGE UP (ref 1–3.3)
MCHC RBC-ENTMCNC: 33.5 GM/DL — SIGNIFICANT CHANGE UP (ref 32–36)
MCHC RBC-ENTMCNC: 33.6 PG — SIGNIFICANT CHANGE UP (ref 27–34)
MCV RBC AUTO: 100.4 FL — HIGH (ref 80–100)
MONOCYTES # BLD AUTO: 0.88 K/UL — SIGNIFICANT CHANGE UP (ref 0–0.9)
MONOCYTES NFR BLD AUTO: 5.9 % — SIGNIFICANT CHANGE UP (ref 2–14)
NEUTROPHILS # BLD AUTO: 10.71 K/UL — HIGH (ref 1.8–7.4)
NEUTROPHILS NFR BLD AUTO: 71.2 % — SIGNIFICANT CHANGE UP (ref 43–77)
NRBC # BLD: 0 /100 WBCS — SIGNIFICANT CHANGE UP (ref 0–0)
PLATELET # BLD AUTO: 172 K/UL — SIGNIFICANT CHANGE UP (ref 150–400)
POTASSIUM SERPL-MCNC: 4.4 MMOL/L — SIGNIFICANT CHANGE UP (ref 3.5–5.3)
POTASSIUM SERPL-SCNC: 4.4 MMOL/L — SIGNIFICANT CHANGE UP (ref 3.5–5.3)
PROT SERPL-MCNC: 5.3 G/DL — LOW (ref 6–8.3)
PROTHROM AB SERPL-ACNC: 9.5 SEC — LOW (ref 10.5–13.4)
RBC # BLD: 2.83 M/UL — LOW (ref 3.8–5.2)
RBC # FLD: 13.1 % — SIGNIFICANT CHANGE UP (ref 10.3–14.5)
SODIUM SERPL-SCNC: 136 MMOL/L — SIGNIFICANT CHANGE UP (ref 135–145)
URATE SERPL-MCNC: 5.1 MG/DL — SIGNIFICANT CHANGE UP (ref 2.5–7)
WBC # BLD: 15.02 K/UL — HIGH (ref 3.8–10.5)
WBC # FLD AUTO: 15.02 K/UL — HIGH (ref 3.8–10.5)

## 2022-05-19 RX ADMIN — Medication 600 MILLIGRAM(S): at 12:36

## 2022-05-19 RX ADMIN — Medication 600 MILLIGRAM(S): at 01:00

## 2022-05-19 RX ADMIN — Medication 975 MILLIGRAM(S): at 22:07

## 2022-05-19 RX ADMIN — Medication 975 MILLIGRAM(S): at 15:39

## 2022-05-19 RX ADMIN — Medication 600 MILLIGRAM(S): at 06:31

## 2022-05-19 RX ADMIN — Medication 30 MILLIGRAM(S): at 17:50

## 2022-05-19 RX ADMIN — Medication 600 MILLIGRAM(S): at 05:59

## 2022-05-19 RX ADMIN — MAGNESIUM HYDROXIDE 30 MILLILITER(S): 400 TABLET, CHEWABLE ORAL at 15:42

## 2022-05-19 RX ADMIN — Medication 600 MILLIGRAM(S): at 17:51

## 2022-05-19 RX ADMIN — HEPARIN SODIUM 5000 UNIT(S): 5000 INJECTION INTRAVENOUS; SUBCUTANEOUS at 17:51

## 2022-05-19 RX ADMIN — Medication 600 MILLIGRAM(S): at 13:36

## 2022-05-19 RX ADMIN — Medication 975 MILLIGRAM(S): at 16:32

## 2022-05-19 RX ADMIN — Medication 975 MILLIGRAM(S): at 21:37

## 2022-05-19 RX ADMIN — HEPARIN SODIUM 5000 UNIT(S): 5000 INJECTION INTRAVENOUS; SUBCUTANEOUS at 05:59

## 2022-05-19 RX ADMIN — Medication 975 MILLIGRAM(S): at 02:34

## 2022-05-19 RX ADMIN — Medication 975 MILLIGRAM(S): at 10:23

## 2022-05-19 RX ADMIN — Medication 975 MILLIGRAM(S): at 03:07

## 2022-05-19 RX ADMIN — Medication 975 MILLIGRAM(S): at 09:23

## 2022-05-19 RX ADMIN — Medication 600 MILLIGRAM(S): at 18:42

## 2022-05-19 NOTE — PROGRESS NOTE ADULT - ATTENDING COMMENTS
pod 2    no s/s pec sp magnesium    co some residual incisional discomfort    tolerating diet    bp's normotensive  labs stable    pumping with nicu assistance    incision dry intact    lochia light    plan ambulation    procardia xr 30    will continue with lactation assistance from nicu nurses    reg diet

## 2022-05-20 ENCOUNTER — TRANSCRIPTION ENCOUNTER (OUTPATIENT)
Age: 37
End: 2022-05-20

## 2022-05-20 VITALS
RESPIRATION RATE: 18 BRPM | DIASTOLIC BLOOD PRESSURE: 87 MMHG | OXYGEN SATURATION: 98 % | HEART RATE: 71 BPM | TEMPERATURE: 98 F | SYSTOLIC BLOOD PRESSURE: 144 MMHG

## 2022-05-20 PROCEDURE — 84156 ASSAY OF PROTEIN URINE: CPT

## 2022-05-20 PROCEDURE — G0463: CPT

## 2022-05-20 PROCEDURE — 83735 ASSAY OF MAGNESIUM: CPT

## 2022-05-20 PROCEDURE — 36415 COLL VENOUS BLD VENIPUNCTURE: CPT

## 2022-05-20 PROCEDURE — 85610 PROTHROMBIN TIME: CPT

## 2022-05-20 PROCEDURE — 86850 RBC ANTIBODY SCREEN: CPT

## 2022-05-20 PROCEDURE — U0003: CPT

## 2022-05-20 PROCEDURE — 84550 ASSAY OF BLOOD/URIC ACID: CPT

## 2022-05-20 PROCEDURE — 85384 FIBRINOGEN ACTIVITY: CPT

## 2022-05-20 PROCEDURE — 88307 TISSUE EXAM BY PATHOLOGIST: CPT

## 2022-05-20 PROCEDURE — 86780 TREPONEMA PALLIDUM: CPT

## 2022-05-20 PROCEDURE — 59050 FETAL MONITOR W/REPORT: CPT

## 2022-05-20 PROCEDURE — 83615 LACTATE (LD) (LDH) ENZYME: CPT

## 2022-05-20 PROCEDURE — 81001 URINALYSIS AUTO W/SCOPE: CPT

## 2022-05-20 PROCEDURE — 86901 BLOOD TYPING SEROLOGIC RH(D): CPT

## 2022-05-20 PROCEDURE — 82570 ASSAY OF URINE CREATININE: CPT

## 2022-05-20 PROCEDURE — 80053 COMPREHEN METABOLIC PANEL: CPT

## 2022-05-20 PROCEDURE — 85025 COMPLETE CBC W/AUTO DIFF WBC: CPT

## 2022-05-20 PROCEDURE — 85730 THROMBOPLASTIN TIME PARTIAL: CPT

## 2022-05-20 PROCEDURE — 86900 BLOOD TYPING SEROLOGIC ABO: CPT

## 2022-05-20 PROCEDURE — 86769 SARS-COV-2 COVID-19 ANTIBODY: CPT

## 2022-05-20 PROCEDURE — U0005: CPT

## 2022-05-20 PROCEDURE — 59025 FETAL NON-STRESS TEST: CPT

## 2022-05-20 RX ORDER — IBUPROFEN 200 MG
1 TABLET ORAL
Qty: 36 | Refills: 0
Start: 2022-05-20 | End: 2022-05-28

## 2022-05-20 RX ADMIN — Medication 600 MILLIGRAM(S): at 11:51

## 2022-05-20 RX ADMIN — Medication 600 MILLIGRAM(S): at 00:41

## 2022-05-20 RX ADMIN — Medication 600 MILLIGRAM(S): at 12:30

## 2022-05-20 RX ADMIN — HEPARIN SODIUM 5000 UNIT(S): 5000 INJECTION INTRAVENOUS; SUBCUTANEOUS at 06:10

## 2022-05-20 RX ADMIN — Medication 30 MILLIGRAM(S): at 17:46

## 2022-05-20 RX ADMIN — Medication 975 MILLIGRAM(S): at 15:09

## 2022-05-20 RX ADMIN — Medication 600 MILLIGRAM(S): at 15:45

## 2022-05-20 RX ADMIN — Medication 600 MILLIGRAM(S): at 06:10

## 2022-05-20 RX ADMIN — Medication 975 MILLIGRAM(S): at 03:28

## 2022-05-20 RX ADMIN — Medication 975 MILLIGRAM(S): at 09:21

## 2022-05-20 RX ADMIN — Medication 600 MILLIGRAM(S): at 17:46

## 2022-05-20 RX ADMIN — Medication 600 MILLIGRAM(S): at 06:40

## 2022-05-20 RX ADMIN — Medication 975 MILLIGRAM(S): at 16:00

## 2022-05-20 RX ADMIN — Medication 975 MILLIGRAM(S): at 03:58

## 2022-05-20 RX ADMIN — Medication 975 MILLIGRAM(S): at 09:30

## 2022-05-20 RX ADMIN — Medication 600 MILLIGRAM(S): at 01:11

## 2022-05-20 NOTE — DISCHARGE NOTE OB - CARE PLAN
1 Principal Discharge DX:	 delivery delivered  Assessment and plan of treatment:	reg diet  Secondary Diagnosis:	Hypertension in pregnancy, preeclampsia  Assessment and plan of treatment:	continue procardia  check bp 2x/day -hold if bp less than 110/70 call if greater than 140/90

## 2022-05-20 NOTE — DISCHARGE NOTE OB - INSTRUCTIONS
any increase in temp or bleeding call MD or go to Emergency Department take and record BP 2 x per day Call MD if BP is above 150/90 If BP is below 110/60 hold medication for 1 hr then retake BP if still below 110/60 call MD

## 2022-05-20 NOTE — DISCHARGE NOTE OB - PATIENT PORTAL LINK FT
You can access the FollowMyHealth Patient Portal offered by [facility] by registering at the following website: [email or web address]. By joining [facility]’s FollowMyHealth portal, you will also be able to view your health information using other applications (apps) compatible with our system.

## 2022-05-20 NOTE — DISCHARGE NOTE OB - CARE PROVIDER_API CALL
Mukund Huston)  Medicine  Critical Care  [address] [facility] [place] [address]  Phone: [phone]  Fax: [phone]  Follow Up Time:

## 2022-05-20 NOTE — DISCHARGE NOTE OB - PLAN OF CARE
reg diet continue procardia  check bp 2x/day -hold if bp less than 110/70 call if greater than 140/90

## 2022-05-20 NOTE — PROGRESS NOTE ADULT - ASSESSMENT
Ms. Mireles is a 37y  POD#3 status post pLTCS for sEPC @ 35w3d. Pt doing well and meeting appropriate postop milestones.     #sEPC  - status post Mag ( - )  - HELLP labs wnl   - ROS & PE unremarkable   - BPs normotensive overnight     #Maternal Status  - Reg diet  - HSQ, SCDs, ambulation for DVT prophylaxis       Elena Richardson MD  OBGYN PGY3 
Day 1 of Anesthesia Pain Management Service    SUBJECTIVE:  Pain Scale Score:          [X] Refer to charted pain scores    THERAPY: Received PF neuraxial morphine as per pain service nurse's note    OBJECTIVE:    Sedation:        	[X] Alert	[ ] Drowsy	[ ] Arousable      [ ] Asleep       [ ] Unresponsive    Side Effects:	[X] None	[ ] Nausea	[ ] Vomiting         [ ] Pruritus  		[ ] Weakness            [ ] Numbness	          [ ] Other:    ASSESSMENT/ PLAN  [X] Patient transitioned to prn analgesics  [X] Pain management per primary service, pain service to sign off   [X]Documentation and Verification of current medications
Ms. Mireles is a 37y  POD#1 status post pLTCS for sEPC @ 35w3d. Pt doing well and meeting appropriate postop milestones.     #sEPC  - Mag ( - ), elevated level overnight, paused & then resumed & 1.5, d/c @ 9am    - f/u AM HELLP labs   - ROS & PE unremarkable   - BPs normotensive overnight     #Maternal Status  - Reg diet  - HSQ, SCDs, ambulation for DVT prophylaxis       Elena Richardson MD  OBGYN PGY3 
Ms. Mireles is a 37y  POD#2 status post pLTCS for sEPC @ 35w3d. Pt doing well and meeting appropriate postop milestones.     #sEPC  - status post Mag ( - )  - f/u AM HELLP labs   - ROS & PE unremarkable   - BPs normotensive overnight     #Maternal Status  - Reg diet  - HSQ, SCDs, ambulation for DVT prophylaxis       Elena Richardson MD  OBGYN PGY3

## 2022-05-20 NOTE — PROGRESS NOTE ADULT - SUBJECTIVE AND OBJECTIVE BOX
R3 Postpartum Note, HD#2 POD#1    Interval events: Patient seen and examined at bedside, overnight events pt with elevated Mag level & symptomatic. Mag paused & sx improved. No acute complaints currently. Pt denies HA, epigastric pain, blurred vision, CP, SOB, N/V, fevers, or chills.    Vital Signs Last 24 Hours  T(C): 36.2 (05-18-22 @ 05:00), Max: 36.8 (05-17-22 @ 07:30)  HR: 71 (05-18-22 @ 05:00) (69 - 148)  BP: 121/74 (05-18-22 @ 05:00) (102/65 - 142/83)  RR: 18 (05-18-22 @ 05:00) (16 - 20)  SpO2: 97% (05-18-22 @ 05:00) (95% - 99%)      Physical Exam:  General: NAD  Resp: breathing comfortably on RA   Abdomen: Soft, appropriately tender, fundus at umbilicus   Incision: C/D/I  Ext: No pain or swelling, SCDs in place       Labs:             10.8   13.20 )-----------( 175      ( 05-17 @ 11:25 )             32.5     05-17 @ 11:25    134  |  101  |  17  ----------------------------<  172  4.4   |  16  |  0.69    Ca    7.4      05-17 @ 11:25  Mg     7.0     05-17 @ 20:27    TPro  6.0  /  Alb  3.0  /  TBili  0.2  /  DBili  x   /  AST  27  /  ALT  26  /  AlkPhos  163  05-17 @ 11:25    PT/INR - ( 05-17 @ 11:25 )   PT: 9.9 sec;   INR: 0.85 ratio    PTT - ( 05-17 @ 11:25 )  PTT:27.8 sec    Uric Acid: (05-17 @ 11:25)  7.0      Fibrinogen: (05-17 @ 11:25)  472      LDH: (05-17 @ 11:25)  208        MEDICATIONS  (STANDING):  acetaminophen     Tablet .. 975 milliGRAM(s) Oral <User Schedule>  acetaminophen     Tablet .. 975 milliGRAM(s) Oral once  diphtheria/tetanus/pertussis (acellular) Vaccine (ADAcel) 0.5 milliLiter(s) IntraMuscular once  heparin   Injectable 5000 Unit(s) SubCutaneous every 12 hours  ibuprofen  Tablet. 600 milliGRAM(s) Oral every 6 hours  lactated ringers. 1000 milliLiter(s) (50 mL/Hr) IV Continuous <Continuous>  magnesium sulfate Infusion 1.5 Gm/Hr (37.5 mL/Hr) IV Continuous <Continuous>  morphine PF Spinal 0.1 milliGRAM(s) IntraThecal. once  NIFEdipine XL 30 milliGRAM(s) Oral daily  oxytocin Infusion 333.333 milliUNIT(s)/Min (1000 mL/Hr) IV Continuous <Continuous>    MEDICATIONS  (PRN):  dexAMETHasone  Injectable 4 milliGRAM(s) IV Push every 6 hours PRN Nausea  diphenhydrAMINE 25 milliGRAM(s) Oral every 6 hours PRN Pruritus  lanolin Ointment 1 Application(s) Topical every 6 hours PRN Sore Nipples  magnesium hydroxide Suspension 30 milliLiter(s) Oral two times a day PRN Constipation  nalbuphine Injectable 2.5 milliGRAM(s) IV Push every 6 hours PRN Pruritus  naloxone Injectable 0.1 milliGRAM(s) IV Push every 3 minutes PRN For ANY of the following changes in patient status:  A. Breaths Per Minute LESS THAN 10, B. Oxygen saturation LESS THAN 90%, C. Sedation score of 6 for Stop After: 4 Times  ondansetron Injectable 4 milliGRAM(s) IV Push every 6 hours PRN Nausea  oxyCODONE    IR 5 milliGRAM(s) Oral every 3 hours PRN Mild Pain (1 - 3)  oxyCODONE    IR 5 milliGRAM(s) Oral every 3 hours PRN Moderate to Severe Pain (4-10)  oxyCODONE    IR 5 milliGRAM(s) Oral once PRN Moderate to Severe Pain (4-10)  simethicone 80 milliGRAM(s) Chew every 4 hours PRN Gas  
R3 Postpartum Note, HD#3 POD#2    Interval events: Patient seen and examined at bedside, no acute overnight events. No acute complaints currently. Pt denies HA, epigastric pain, blurred vision, CP, SOB, N/V, fevers, or chills, just complaining of soreness associated with ambulation.     Vital Signs Last 24 Hours  T(C): 37.1 (05-19-22 @ 05:41), Max: 37.1 (05-19-22 @ 05:41)  HR: 65 (05-19-22 @ 05:41) (63 - 72)  BP: 95/60 (05-19-22 @ 05:41) (95/60 - 131/81)  RR: 18 (05-19-22 @ 05:41) (18 - 18)  SpO2: 97% (05-19-22 @ 05:41) (97% - 98%)    CAPILLARY BLOOD GLUCOSE          Physical Exam:  General: NAD  Resp: breathing comfortably on RA  Abdomen: Soft, appropriately tender, fundus at umbilicus   Incision: C/D/I  Ext: No pain or swelling, SCDs in place        Labs:             10.6   18.63 )-----------( 161      ( 05-18 @ 06:47 )             32.1     05-18 @ 06:40    135  |  99  |  12  ----------------------------<  119  5.1   |  23  |  0.67    Ca    6.6      05-18 @ 06:40  Mg     7.0     05-18 @ 06:40    TPro  5.8  /  Alb  3.2  /  TBili  0.2  /  DBili  x   /  AST  26  /  ALT  24  /  AlkPhos  140  05-18 @ 06:40    PT/INR - ( 05-18 @ 06:47 )   PT: 9.3 sec;   INR: 0.80 ratio    PTT - ( 05-18 @ 06:47 )  PTT:26.7 sec    Uric Acid: (05-19 @ 07:27)  --       Fibrinogen: (05-19 @ 07:27)  488      LDH: (05-19 @ 07:27)  --         MEDICATIONS  (STANDING):  acetaminophen     Tablet .. 975 milliGRAM(s) Oral <User Schedule>  acetaminophen     Tablet .. 975 milliGRAM(s) Oral once  diphtheria/tetanus/pertussis (acellular) Vaccine (ADAcel) 0.5 milliLiter(s) IntraMuscular once  heparin   Injectable 5000 Unit(s) SubCutaneous every 12 hours  ibuprofen  Tablet. 600 milliGRAM(s) Oral every 6 hours  NIFEdipine XL 30 milliGRAM(s) Oral daily  oxytocin Infusion 333.333 milliUNIT(s)/Min (1000 mL/Hr) IV Continuous <Continuous>    MEDICATIONS  (PRN):  diphenhydrAMINE 25 milliGRAM(s) Oral every 6 hours PRN Pruritus  lanolin Ointment 1 Application(s) Topical every 6 hours PRN Sore Nipples  magnesium hydroxide Suspension 30 milliLiter(s) Oral two times a day PRN Constipation  oxyCODONE    IR 5 milliGRAM(s) Oral once PRN Moderate to Severe Pain (4-10)  oxyCODONE    IR 5 milliGRAM(s) Oral every 3 hours PRN Moderate to Severe Pain (4-10)  simethicone 80 milliGRAM(s) Chew every 4 hours PRN Gas      
Day 1 of Anesthesia Pain Management Service    SUBJECTIVE: Doing ok  Pain Scale Score:          [X] Refer to charted pain scores    THERAPY:  s/p 100 mcg PF morphine on 5\17\2022     MEDICATIONS  (STANDING):  acetaminophen     Tablet .. 975 milliGRAM(s) Oral <User Schedule>  acetaminophen     Tablet .. 975 milliGRAM(s) Oral once  diphtheria/tetanus/pertussis (acellular) Vaccine (ADAcel) 0.5 milliLiter(s) IntraMuscular once  heparin   Injectable 5000 Unit(s) SubCutaneous every 12 hours  ibuprofen  Tablet. 600 milliGRAM(s) Oral every 6 hours  morphine PF Spinal 0.1 milliGRAM(s) IntraThecal. once  NIFEdipine XL 30 milliGRAM(s) Oral daily  oxytocin Infusion 333.333 milliUNIT(s)/Min (1000 mL/Hr) IV Continuous <Continuous>    MEDICATIONS  (PRN):  dexAMETHasone  Injectable 4 milliGRAM(s) IV Push every 6 hours PRN Nausea  diphenhydrAMINE 25 milliGRAM(s) Oral every 6 hours PRN Pruritus  lanolin Ointment 1 Application(s) Topical every 6 hours PRN Sore Nipples  magnesium hydroxide Suspension 30 milliLiter(s) Oral two times a day PRN Constipation  nalbuphine Injectable 2.5 milliGRAM(s) IV Push every 6 hours PRN Pruritus  naloxone Injectable 0.1 milliGRAM(s) IV Push every 3 minutes PRN For ANY of the following changes in patient status:  A. Breaths Per Minute LESS THAN 10, B. Oxygen saturation LESS THAN 90%, C. Sedation score of 6 for Stop After: 4 Times  oxyCODONE    IR 5 milliGRAM(s) Oral every 3 hours PRN Mild Pain (1 - 3)  oxyCODONE    IR 5 milliGRAM(s) Oral every 3 hours PRN Moderate to Severe Pain (4-10)  oxyCODONE    IR 5 milliGRAM(s) Oral once PRN Moderate to Severe Pain (4-10)  simethicone 80 milliGRAM(s) Chew every 4 hours PRN Gas      OBJECTIVE:    Sedation:        	[X] Alert	 [ ] Drowsy	[ ] Arousable      [ ] Asleep       [ ] Unresponsive    Side Effects:	[ ] None 	[ X] Nausea	[ ] Vomiting         [ ] Pruritus  		[ ] Weakness            [ ] Numbness	          [ ] Other:    Vital Signs Last 24 Hrs  T(C): 36.2 (18 May 2022 05:00), Max: 36.8 (17 May 2022 09:46)  T(F): 97.1 (18 May 2022 05:00), Max: 97.9 (18 May 2022 01:00)  HR: 69 (18 May 2022 07:02) (69 - 134)  BP: 126/80 (18 May 2022 07:02) (102/65 - 142/83)  BP(mean): 92 (17 May 2022 11:50) (76 - 95)  RR: 18 (18 May 2022 07:02) (16 - 20)  SpO2: 97% (18 May 2022 07:02) (95% - 98%)    ASSESSMENT/ PLAN  [X] Patient transitioned to prn analgesics  [X] Pain management per primary service, pain service to sign off   [X]Documentation and Verification of current medications
R3 Postpartum Note    Patient seen and examined at bedside, no acute overnight events. No acute complaints. Pt tolerating regular diet, ambulating, passing flatus and voiding. Pt denies CP, SOB, N/V, fevers, and chills.    Vital Signs Last 24 Hours  T(C): 36.7 (05-20-22 @ 00:14), Max: 37.4 (05-19-22 @ 13:04)  HR: 60 (05-20-22 @ 00:14) (60 - 72)  BP: 128/79 (05-20-22 @ 00:14) (95/60 - 128/79)  RR: 18 (05-20-22 @ 00:14) (18 - 18)  SpO2: 96% (05-20-22 @ 00:14) (96% - 98%)      Physical Exam:  General: NAD  Resp: breathing comfortably on RA  Abdomen: Soft, appropriately tender, fundus at umbilicus   Incision: C/D/I  : minimal lochia   Ext: No pain or swelling, SCDs in place        Labs:             9.5    15.02 )-----------( 172      ( 05-19 @ 07:27 )             28.4     05-19 @ 07:27    136  |  102  |  16  ----------------------------<  76  4.4   |  24  |  0.68    Ca    7.3      05-19 @ 07:27  Mg     7.0     05-18 @ 06:40    TPro  5.3  /  Alb  2.9  /  TBili  0.2  /  DBili  x   /  AST  25  /  ALT  17  /  AlkPhos  119  05-19 @ 07:27    PT/INR - ( 05-19 @ 07:27 )   PT: 9.5 sec;   INR: 0.82 ratio    PTT - ( 05-19 @ 07:27 )  PTT:37.3 sec    Uric Acid: (05-19 @ 07:27)  5.1      Fibrinogen: (05-19 @ 07:27)  488      LDH: (05-19 @ 07:27)  258        MEDICATIONS  (STANDING):  acetaminophen     Tablet .. 975 milliGRAM(s) Oral <User Schedule>  acetaminophen     Tablet .. 975 milliGRAM(s) Oral once  diphtheria/tetanus/pertussis (acellular) Vaccine (ADAcel) 0.5 milliLiter(s) IntraMuscular once  heparin   Injectable 5000 Unit(s) SubCutaneous every 12 hours  ibuprofen  Tablet. 600 milliGRAM(s) Oral every 6 hours  NIFEdipine XL 30 milliGRAM(s) Oral daily  oxytocin Infusion 333.333 milliUNIT(s)/Min (1000 mL/Hr) IV Continuous <Continuous>    MEDICATIONS  (PRN):  diphenhydrAMINE 25 milliGRAM(s) Oral every 6 hours PRN Pruritus  lanolin Ointment 1 Application(s) Topical every 6 hours PRN Sore Nipples  magnesium hydroxide Suspension 30 milliLiter(s) Oral two times a day PRN Constipation  oxyCODONE    IR 5 milliGRAM(s) Oral once PRN Moderate to Severe Pain (4-10)  oxyCODONE    IR 5 milliGRAM(s) Oral every 3 hours PRN Moderate to Severe Pain (4-10)  simethicone 80 milliGRAM(s) Chew every 4 hours PRN Gas

## 2022-06-12 LAB — SURGICAL PATHOLOGY STUDY: SIGNIFICANT CHANGE UP

## 2023-04-25 NOTE — OB RN DELIVERY SUMMARY - BABY A: WEIGHT (GM) DELIVERY
GENERAL SURGERY DISCHARGE NOTE    PATIENT INFO:   · Name: Doreen Field  · Medical Record Number: [id number]    DATE OF ADMISSION: 4/24/2023    DATE OF DISCHARGE: 4/25/2023    PRINCIPLE DIAGNOSIS: morbid obesity, hiatal hernia     OPERATION: Robotic-assisted laparoscopic sleeve gastrectomy with hiatal hernia repair and lysis of adhesions (45 min) and intra-op EGD 4/24/2023    INDICATIONS FOR ADMISSION:  Doreen Field is a 46 year old female with hx failed laparoscopic gastric band s/p removal in 2022 who has persistent morbid obesity is interested in revisional bariatric surgery, specifically the Sleeve Gastrectomy, and has been working with the bariatric program and been cleared for surgery, completed her pre-op EGD at which time a recurrent hiatal hernia was identified (and therefore hiatal hernia repair was indicated), pre-op diet, and pre-op medical clearance, and presents for her sleeve gastrectomy and hiatal hernia repair as planned. Surgery requires an inpatient hospitalization.     HOSPITAL COURSE:  Doreen Field presented to [facility] on the date of admission for the above planned procedure. The procedure was well tolerated. The patient was weaned and extubated and transferred to the PACU in stable condition, then to the post surgical floor. She was started on sips of clear liquids On POD #1, she was afebrile with stable vitals. UGI demonstrated no evidence of leak. Her diet was progressed. She was ambulating and voiding, passing flatus. Incisional pain well controlled. Epigastric and gas pain gradually improving. Appropriate for discharge by afternoon.     VITALS:  Vital Last Value 24 Hour Range   Temperature 98.2 °F (36.8 °C) Temp  Min: 97.9 °F (36.6 °C)  Max: 99.1 °F (37.3 °C)   Pulse 60 Pulse  Min: 60  Max: 96   Respiratory 17 Resp  Min: 16  Max: 20   Blood Pressure 105/66 (04/25/23 1240) BP  Min: 105/66  Max: 155/81   Pulse Oximetry 99 % SpO2  Min: 97 %  Max: 100 %     Vital Today Admit   Weight  (!) 143.6 kg (316 lb 9.3 oz) Weight: (!) 142 kg (313 lb 0.9 oz)   Height  Height: 5' 7\" (170.2 cm)   BMI  BMI (Calculated): 49.03     INTAKE/OUTPUT:  Date 04/24/23 1500 - 04/25/23 0659 04/25/23 0700 - 04/26/23 0659   Shift 8344-9130 2012-2750 24 Hour Total 5693-9699 9501-2462 2646-8010 24 Hour Total   INTAKE   P.O.  50 250       I.V. 1104 1100 3454       Shift Total 1104 1150 3704       OUTPUT   Urine  100 100       Shift Total  100 100       Weight (kg) 142 143.6 143.6 143.6 143.6 143.6 143.6       PHYSICAL EXAM:  · CONSTITUTIONAL: Vital signs (see above) reviewed. General appearance: Awake, alert, well-developed, in no apparent distress  · CARDIOVASCULAR: Heart rate is normal  · RESPIRATORY: Breathing is non-labored  · GASTROINTESTINAL: Abdomen is soft, obese, non-distended, appropriately tender, incisions intact with skin glue    LABORATORY RESULTS:   CBC:  Recent Labs   Lab 04/25/23  0446   WBC 7.4   HCT 32.9*   HGB 10.3*            BMP:  Recent Labs   Lab 04/25/23  0446   SODIUM 141   POTASSIUM 4.3   CHLORIDE 106   CO2 28   BUN 14   CREATININE 0.82   GLUCOSE 98       UGI:  EXAM: FL UPPER GI SINGLE CONTRAST     HISTORY: Test for leak, hiatal hernia repair        FINDINGS/IMPRESSION: No contrast technique utilized with Gastrografin. The  patient ingested contrast without difficulty. Postoperative changes gastric  sleeve. Contrast extends into the duodenum.     No extravasation of contrast.     ASSESSMENT:  Doreen Field is a 46 year old female with history of morbid obesity, hiatal hernia in setting of prior gastric band procedure who is s/p Robotic-assisted laparoscopic sleeve gastrectomy with hiatal hernia repair and lysis of adhesions (45 min) and intra-op EGD 4/24/2023  POD#1 - clinically and hemodynamically stable, progressing appropriately for discharge this afternoon     PLAN:   1. Pain control: with oxycodone solution for discharge, liquid tylenol   2. Diet: Bariatric clear liquid diet today,  9835 progress to bariatric full liquid diet tomorrow for 1 week  3. Medications: resume home mediations, hold NSAIDs  4. Antibiotics: Completed  5. Activity: Ambulate daily, may climb stairs. Avoiding lifting more than 10-15 lbs x 1 month  6. Wound care: May shower with regular soap and water, but no soaking underwater (in pools, tubs, etc) until wounds fully healed and cleared by MD in clinic  8. Dispo: Ready for discharge home this afternoon  9. Follow up: With the surgical team in 1 week    POST DISCHARGE VTE RISK ASSESSMENT by [facility] Risk Calculator:   --Mild: 0.27% risk  --Plan: No need for extended post discharge prophylaxis     Olga Plummer PA-C / [facility]  Pager: [phone]

## 2023-05-16 PROBLEM — F41.9 ANXIETY DISORDER, UNSPECIFIED: Chronic | Status: ACTIVE | Noted: 2022-05-17

## 2023-05-22 PROBLEM — Z00.00 ENCOUNTER FOR PREVENTIVE HEALTH EXAMINATION: Status: ACTIVE | Noted: 2023-05-22

## 2023-05-24 ENCOUNTER — APPOINTMENT (OUTPATIENT)
Dept: GASTROENTEROLOGY | Facility: CLINIC | Age: 38
End: 2023-05-24
Payer: COMMERCIAL

## 2023-05-24 VITALS
WEIGHT: 103 LBS | OXYGEN SATURATION: 99 % | DIASTOLIC BLOOD PRESSURE: 80 MMHG | HEIGHT: 63 IN | HEART RATE: 82 BPM | SYSTOLIC BLOOD PRESSURE: 120 MMHG | BODY MASS INDEX: 18.25 KG/M2

## 2023-05-24 DIAGNOSIS — Z78.9 OTHER SPECIFIED HEALTH STATUS: ICD-10-CM

## 2023-05-24 DIAGNOSIS — A04.8 OTHER SPECIFIED BACTERIAL INTESTINAL INFECTIONS: ICD-10-CM

## 2023-05-24 PROCEDURE — 99204 OFFICE O/P NEW MOD 45 MIN: CPT | Mod: 25

## 2023-05-24 PROCEDURE — 83014 H PYLORI DRUG ADMIN: CPT

## 2023-05-24 RX ORDER — PANTOPRAZOLE 40 MG/1
40 TABLET, DELAYED RELEASE ORAL
Qty: 30 | Refills: 4 | Status: ACTIVE | COMMUNITY
Start: 2023-05-24 | End: 1900-01-01

## 2023-05-24 NOTE — CONSULT LETTER
[Dear  ___] : Dear  [unfilled], [Consult Letter:] : I had the pleasure of evaluating your patient, [unfilled]. [( Thank you for referring [unfilled] for consultation for _____ )] : Thank you for referring [unfilled] for consultation for [unfilled] [Please see my note below.] : Please see my note below. [Consult Closing:] : Thank you very much for allowing me to participate in the care of this patient.  If you have any questions, please do not hesitate to contact me. [Sincerely,] : Sincerely, [FreeTextEntry3] : Don\par \par Marvin Belle MD\par \par Gastroenterology\par [facility]\par [facility]\par \par

## 2023-05-24 NOTE — HISTORY OF PRESENT ILLNESS
[FreeTextEntry1] : She is a 38 year old female with a 3 week history of a change in hr bowel habits  to soft stool after she finished a 10 day course of azithromycin. She denies  rectal bleeding. She also admits to midepigastric abdominal pain  She has a past  history of Helicobacter pylori  which was treated 2 years ago. She has an acid feeling in her stomach after drinking  coffee and Coca cola.

## 2023-05-24 NOTE — ASSESSMENT
[FreeTextEntry1] : R/O recurrent H pylori\par R/o gastritis versus IBS\par R/O C' diff colitis\par \par Breath test administered\par Stool for GPP especially C' diff and calprotctin\par Empiric trial of  pantoprazole  \par \par Office follow up  2 weeks

## 2023-05-25 LAB — UREA BREATH TEST QL: NEGATIVE

## 2023-05-31 ENCOUNTER — TRANSCRIPTION ENCOUNTER (OUTPATIENT)
Age: 38
End: 2023-05-31

## 2023-05-31 RX ORDER — FAMOTIDINE 20 MG/1
20 TABLET, FILM COATED ORAL
Qty: 30 | Refills: 3 | Status: ACTIVE | COMMUNITY
Start: 2023-05-31 | End: 1900-01-01

## 2023-06-06 ENCOUNTER — TRANSCRIPTION ENCOUNTER (OUTPATIENT)
Age: 38
End: 2023-06-06

## 2023-06-09 ENCOUNTER — APPOINTMENT (OUTPATIENT)
Dept: GASTROENTEROLOGY | Facility: CLINIC | Age: 38
End: 2023-06-09
Payer: COMMERCIAL

## 2023-06-09 VITALS
BODY MASS INDEX: 18.25 KG/M2 | OXYGEN SATURATION: 99 % | DIASTOLIC BLOOD PRESSURE: 68 MMHG | HEIGHT: 63 IN | HEART RATE: 78 BPM | SYSTOLIC BLOOD PRESSURE: 100 MMHG | WEIGHT: 103 LBS | RESPIRATION RATE: 14 BRPM

## 2023-06-09 DIAGNOSIS — R10.13 EPIGASTRIC PAIN: ICD-10-CM

## 2023-06-09 DIAGNOSIS — R19.4 CHANGE IN BOWEL HABIT: ICD-10-CM

## 2023-06-09 DIAGNOSIS — R19.7 DIARRHEA, UNSPECIFIED: ICD-10-CM

## 2023-06-09 PROCEDURE — 99213 OFFICE O/P EST LOW 20 MIN: CPT

## 2023-06-09 NOTE — HISTORY OF PRESENT ILLNESS
[FreeTextEntry1] : She was started on pantoprazole , then famotidine,  both gave her abdominal pain so were stopped. She is feeling much better now  with her stool having normalized. Her Breath Test was negative  for H. pylori and her stool test was also negative for bacteria.

## 2023-06-09 NOTE — CONSULT LETTER
[Dear  ___] : Dear  [unfilled], [Consult Letter:] : I had the pleasure of evaluating your patient, [unfilled]. [( Thank you for referring [unfilled] for consultation for _____ )] : Thank you for referring [unfilled] for consultation for [unfilled] [Please see my note below.] : Please see my note below. [Consult Closing:] : Thank you very much for allowing me to participate in the care of this patient.  If you have any questions, please do not hesitate to contact me. [Sincerely,] : Sincerely, [FreeTextEntry3] : Don\par \par Marvin Belle MD\par \par Gastroenterology\par [facility]\par [facility]\par \par

## 2023-06-09 NOTE — ASSESSMENT
[FreeTextEntry1] : Continue  high fiber diet\par \par Office follow up if needed\par \par \par I spent 23 minutes with the patient and answered all of her questions

## 2023-06-09 NOTE — CONSULT LETTER
[Dear  ___] : Dear  [unfilled], [Consult Letter:] : I had the pleasure of evaluating your patient, [unfilled]. [( Thank you for referring [unfilled] for consultation for _____ )] : Thank you for referring [unfilled] for consultation for [unfilled] [Please see my note below.] : Please see my note below. [Consult Closing:] : Thank you very much for allowing me to participate in the care of this patient.  If you have any questions, please do not hesitate to contact me. [Sincerely,] : Sincerely, [FreeTextEntry3] : Don\par \par Marvin Belle MD\par \par Gastroenterology\par [facility]\par [facility]\par \par

## 2023-09-08 ENCOUNTER — RESULT REVIEW (OUTPATIENT)
Age: 38
End: 2023-09-08

## 2024-01-24 NOTE — OB RN PATIENT PROFILE - NSICDXNOPASTMEDICALHX_GEN_ALL_CORE
Left voicemail to schedule pediatric GI appointment within 1 week per clinical review. Okay to use [facility] or back-to-back return slots at [facility]. Please assist with scheduling if family calls back.    Didi Stewart on 1/24/2024 at 9:26 AM    
<-- Click to add NO pertinent Past Medical History

## 2024-07-16 DIAGNOSIS — O14.10 SEVERE PRE-ECLAMPSIA, UNSPECIFIED TRIMESTER: ICD-10-CM

## 2024-07-19 ENCOUNTER — APPOINTMENT (OUTPATIENT)
Dept: CARDIOLOGY | Facility: CLINIC | Age: 39
End: 2024-07-19

## 2024-08-30 ENCOUNTER — INPATIENT (INPATIENT)
Facility: HOSPITAL | Age: 39
LOS: 2 days | Discharge: HOME | DRG: 951 | End: 2024-09-02
Attending: STUDENT IN AN ORGANIZED HEALTH CARE EDUCATION/TRAINING PROGRAM | Admitting: STUDENT IN AN ORGANIZED HEALTH CARE EDUCATION/TRAINING PROGRAM
Payer: COMMERCIAL

## 2024-08-30 VITALS — OXYGEN SATURATION: 100 %

## 2024-08-30 DIAGNOSIS — Z98.891 HISTORY OF UTERINE SCAR FROM PREVIOUS SURGERY: Chronic | ICD-10-CM

## 2024-08-30 DIAGNOSIS — Z34.80 ENCOUNTER FOR SUPERVISION OF OTHER NORMAL PREGNANCY, UNSPECIFIED TRIMESTER: ICD-10-CM

## 2024-08-30 DIAGNOSIS — O26.899 OTHER SPECIFIED PREGNANCY RELATED CONDITIONS, UNSPECIFIED TRIMESTER: ICD-10-CM

## 2024-08-30 LAB
BASOPHILS # BLD AUTO: 0.03 K/UL — SIGNIFICANT CHANGE UP (ref 0–0.2)
BASOPHILS NFR BLD AUTO: 0.3 % — SIGNIFICANT CHANGE UP (ref 0–2)
BLD GP AB SCN SERPL QL: NEGATIVE — SIGNIFICANT CHANGE UP
EOSINOPHIL # BLD AUTO: 0.11 K/UL — SIGNIFICANT CHANGE UP (ref 0–0.5)
EOSINOPHIL NFR BLD AUTO: 1.1 % — SIGNIFICANT CHANGE UP (ref 0–6)
HCT VFR BLD CALC: 34.8 % — SIGNIFICANT CHANGE UP (ref 34.5–45)
HGB BLD-MCNC: 11.4 G/DL — LOW (ref 11.5–15.5)
IMM GRANULOCYTES NFR BLD AUTO: 0.6 % — SIGNIFICANT CHANGE UP (ref 0–0.9)
LYMPHOCYTES # BLD AUTO: 1.99 K/UL — SIGNIFICANT CHANGE UP (ref 1–3.3)
LYMPHOCYTES # BLD AUTO: 20.4 % — SIGNIFICANT CHANGE UP (ref 13–44)
MCHC RBC-ENTMCNC: 32.2 PG — SIGNIFICANT CHANGE UP (ref 27–34)
MCHC RBC-ENTMCNC: 32.8 GM/DL — SIGNIFICANT CHANGE UP (ref 32–36)
MCV RBC AUTO: 98.3 FL — SIGNIFICANT CHANGE UP (ref 80–100)
MONOCYTES # BLD AUTO: 0.74 K/UL — SIGNIFICANT CHANGE UP (ref 0–0.9)
MONOCYTES NFR BLD AUTO: 7.6 % — SIGNIFICANT CHANGE UP (ref 2–14)
NEUTROPHILS # BLD AUTO: 6.84 K/UL — SIGNIFICANT CHANGE UP (ref 1.8–7.4)
NEUTROPHILS NFR BLD AUTO: 70 % — SIGNIFICANT CHANGE UP (ref 43–77)
NRBC # BLD: 0 /100 WBCS — SIGNIFICANT CHANGE UP (ref 0–0)
PLATELET # BLD AUTO: 218 K/UL — SIGNIFICANT CHANGE UP (ref 150–400)
RBC # BLD: 3.54 M/UL — LOW (ref 3.8–5.2)
RBC # FLD: 12.2 % — SIGNIFICANT CHANGE UP (ref 10.3–14.5)
RH IG SCN BLD-IMP: POSITIVE — SIGNIFICANT CHANGE UP
WBC # BLD: 9.77 K/UL — SIGNIFICANT CHANGE UP (ref 3.8–10.5)
WBC # FLD AUTO: 9.77 K/UL — SIGNIFICANT CHANGE UP (ref 3.8–10.5)

## 2024-08-30 DEVICE — INTERCEED 3 X 4": Type: IMPLANTABLE DEVICE | Status: FUNCTIONAL

## 2024-08-30 RX ORDER — DIPHENHYDRAMINE HCL 50 MG
25 CAPSULE ORAL EVERY 6 HOURS
Refills: 0 | Status: DISCONTINUED | OUTPATIENT
Start: 2024-08-31 | End: 2024-09-02

## 2024-08-30 RX ORDER — FAMOTIDINE 10 MG/ML
20 INJECTION INTRAVENOUS ONCE
Refills: 0 | Status: COMPLETED | OUTPATIENT
Start: 2024-08-30 | End: 2024-08-30

## 2024-08-30 RX ORDER — OXYTOCIN 10 UNIT/ML
333.33 AMPUL (ML) INJECTION
Qty: 20 | Refills: 0 | Status: DISCONTINUED | OUTPATIENT
Start: 2024-08-30 | End: 2024-09-02

## 2024-08-30 RX ORDER — LANOLIN
1 OINTMENT (GRAM) TOPICAL EVERY 6 HOURS
Refills: 0 | Status: DISCONTINUED | OUTPATIENT
Start: 2024-08-31 | End: 2024-09-02

## 2024-08-30 RX ORDER — KETOROLAC TROMETHAMINE 30 MG/ML
30 INJECTION, SOLUTION INTRAMUSCULAR EVERY 6 HOURS
Refills: 0 | Status: DISCONTINUED | OUTPATIENT
Start: 2024-08-31 | End: 2024-09-01

## 2024-08-30 RX ORDER — OXYCODONE HYDROCHLORIDE 5 MG/1
5 TABLET ORAL
Refills: 0 | Status: DISCONTINUED | OUTPATIENT
Start: 2024-08-31 | End: 2024-09-02

## 2024-08-30 RX ORDER — CHLORHEXIDINE GLUCONATE 40 MG/ML
1 SOLUTION TOPICAL DAILY
Refills: 0 | Status: DISCONTINUED | OUTPATIENT
Start: 2024-08-30 | End: 2024-08-31

## 2024-08-30 RX ORDER — OXYCODONE HYDROCHLORIDE 5 MG/1
5 TABLET ORAL ONCE
Refills: 0 | Status: DISCONTINUED | OUTPATIENT
Start: 2024-08-31 | End: 2024-09-02

## 2024-08-30 RX ORDER — DEXAMETHASONE 0.75 MG
4 TABLET ORAL EVERY 6 HOURS
Refills: 0 | Status: DISCONTINUED | OUTPATIENT
Start: 2024-08-30 | End: 2024-08-31

## 2024-08-30 RX ORDER — OXYCODONE HYDROCHLORIDE 5 MG/1
5 TABLET ORAL
Refills: 0 | Status: DISCONTINUED | OUTPATIENT
Start: 2024-08-30 | End: 2024-08-31

## 2024-08-30 RX ORDER — OXYTOCIN 10 UNIT/ML
333.33 AMPUL (ML) INJECTION
Qty: 20 | Refills: 0 | Status: DISCONTINUED | OUTPATIENT
Start: 2024-08-31 | End: 2024-09-02

## 2024-08-30 RX ORDER — ONDANSETRON 2 MG/ML
4 INJECTION, SOLUTION INTRAMUSCULAR; INTRAVENOUS EVERY 6 HOURS
Refills: 0 | Status: DISCONTINUED | OUTPATIENT
Start: 2024-08-30 | End: 2024-08-31

## 2024-08-30 RX ORDER — TETANUS TOXOID, REDUCED DIPHTHERIA TOXOID AND ACELLULAR PERTUSSIS VACCINE, ADSORBED 5; 2.5; 8; 8; 2.5 [IU]/.5ML; [IU]/.5ML; UG/.5ML; UG/.5ML; UG/.5ML
0.5 SUSPENSION INTRAMUSCULAR ONCE
Refills: 0 | Status: DISCONTINUED | OUTPATIENT
Start: 2024-08-31 | End: 2024-09-02

## 2024-08-30 RX ORDER — NALOXONE HCL 1 MG/ML
0.1 VIAL (ML) INJECTION
Refills: 0 | Status: DISCONTINUED | OUTPATIENT
Start: 2024-08-30 | End: 2024-08-31

## 2024-08-30 RX ORDER — ACETAMINOPHEN 325 MG/1
975 TABLET ORAL
Refills: 0 | Status: DISCONTINUED | OUTPATIENT
Start: 2024-08-31 | End: 2024-09-02

## 2024-08-30 RX ORDER — SODIUM CITRATE AND CITRIC ACID MONOHYDRATE 334; 500 MG/5ML; MG/5ML
30 SOLUTION ORAL ONCE
Refills: 0 | Status: COMPLETED | OUTPATIENT
Start: 2024-08-30 | End: 2024-08-30

## 2024-08-30 RX ORDER — IBUPROFEN 600 MG
600 TABLET ORAL EVERY 6 HOURS
Refills: 0 | Status: COMPLETED | OUTPATIENT
Start: 2024-08-31 | End: 2025-07-30

## 2024-08-30 RX ORDER — NALBUPHINE HYDROCHLORIDE 10 MG/ML
2.5 INJECTION, SOLUTION INTRAMUSCULAR; INTRAVENOUS; SUBCUTANEOUS EVERY 6 HOURS
Refills: 0 | Status: COMPLETED | OUTPATIENT
Start: 2024-08-30 | End: 2024-08-31

## 2024-08-30 RX ADMIN — FAMOTIDINE 20 MILLIGRAM(S): 10 INJECTION INTRAVENOUS at 19:47

## 2024-08-30 RX ADMIN — Medication 125 MILLILITER(S): at 19:35

## 2024-08-30 RX ADMIN — Medication 1000 MILLIUNIT(S)/MIN: at 20:51

## 2024-08-30 RX ADMIN — SODIUM CITRATE AND CITRIC ACID MONOHYDRATE 30 MILLILITER(S): 334; 500 SOLUTION ORAL at 19:46

## 2024-08-30 NOTE — OB RN PATIENT PROFILE - NSICDXPASTMEDICALHX_GEN_ALL_CORE_FT
PAST MEDICAL HISTORY:  Anxiety     Maternal care for low transverse scar from previous  delivery     Preeclampsia

## 2024-08-30 NOTE — OB PROVIDER H&P - HBSAG: DATE, OB PROFILE
Patient is already scheduled with Cassandra's office. Alternative referral pending for MD signature. Once signed, I will fax to Dr Ray, and let patient know.   09-Feb-2024

## 2024-08-30 NOTE — OB RN PATIENT PROFILE - NS_CIRCUMCISIONPLAN_OBGYN_ALL_OB
ANTICOAGULATION MANAGEMENT     Patient Name:  Ashlie Brantley  Date:  2020    ASSESSMENT /SUBJECTIVE:    Today's INR result of 2.6 is therapeutic. Goal INR of 2.0-3.0      Warfarin dose taken: Warfarin taken as instructed    Diet: No new diet changes affecting INR    Medication changes/ interactions: No new medications/supplements affecting INR    Previous INR: Therapeutic     S/S of bleeding or thromboembolism: No    New injury or illness: No    Upcoming surgery, procedure or cardioversion: No    Additional findings: Daughter is having patient track [place] Intake now.      PLAN:    Telephone call with  Mariela Gibson regarding INR result and instructed:     Warfarin Dosing Instructions: Continue your current warfarin dose 2.5 mg Sun Tue Thu and 5 mg all other days    Instructed patient to follow up no later than: 2 week  Patient to recheck with home meter    Education provided: Monitoring for bleeding signs and symptoms and Monitoring for clotting signs and symptoms      Mariela Gibson  verbalizes understanding and agrees to warfarin dosing plan.    Instructed to call the Anticoagulation Clinic for any changes, questions or concerns. (#[phone])        Luz Allen RN      OBJECTIVE:  Recent labs: (last 7 days)     20   INR 2.6*         No question data found.  Anticoagulation Summary  As of 2020    INR goal:  2.0-3.0   TTR:  50.7 % (1 y)   INR used for dosin.6 (2020)   Warfarin maintenance plan:  2.5 mg (5 mg x 0.5) every Sun, Tue, Thu; 5 mg (5 mg x 1) all other days   Full warfarin instructions:  2.5 mg every Sun, Tue, Thu; 5 mg all other days   Weekly warfarin total:  27.5 mg   No change documented:  Luz Allen RN   Plan last modified:  Claudia Maher, RN (2020)   Next INR check:  2021   Priority:  High   Target end date:  Indefinite    Indications    Atrial fibrillation (H) [I48.91] [I48.91]  Long term current use of anticoagulant therapy  [Z79.01]  Longstanding persistent atrial fibrillation (H) [I48.11]             Anticoagulation Episode Summary     INR check location:      Preferred lab:  EXTERNAL LAB    Send INR reminders to:  AGGIE FRANCIS    Comments:  Call Lisa with any questions this is patients Home Care RN: [phone] // [facility]avi Home Monitoring Patient      Anticoagulation Care Providers     Provider Role Specialty Phone number    Anay Miner NP Referring Nurse Practitioner - Family [phone]            Yes

## 2024-08-30 NOTE — OB PROVIDER H&P - ASSESSMENT
A/P: Pt is a 39y  who presents with ROM    1. Admit to L&D. Routine Labs. IVF  2. For rLTCS  3. Fetus: Breech, Posterior fundal placenta, Reactive/CEFM  4. GBS neg  5. Pain: plan for spinal     Discussed with Dr. Andressa Hull MD, PGY-2  Obstetrics and Gynecology

## 2024-08-30 NOTE — OB RN DELIVERY SUMMARY - NS_LABORCHARACTER_OBGYN_ALL_OB
Uncontrolled based on A1c and reported POCT glucose with significant glycemic variability.    Patient is on an intensive insulin regimen with 4 daily injections and is testing glucose 4+ times daily. Patient has demonstrated an understanding of technology and is motivated to use the device correctly. Patient is expected to adhere to a diabetes treatment plan and is capable of recognizing alerts and alarms. Patient has recurrent, severe (BG <54) hypoglycemia and impaired awareness of hypoglycemia.    Patient's insulin regimen requires frequent adjustments based on BG results. Patient will receive an in-person visit every 6 months to assess adherence to CGM regimen and diabetes treatment.    Keep same regimen pending CGM review   External electronic FM/Antibiotics in labor

## 2024-08-30 NOTE — OB PROVIDER H&P - HISTORY OF PRESENT ILLNESS
HPI: Pt is a 39y   @38w3d presenting for rule out rupture of membranes. She reports feeling leakage of clear fluid at 530p with leakage of fluid since then. She reports feeling fetal movement with no associated contractions or vaginal bleeding.  GBS neg  EFW: 2950g    OBHx: TOP 15y ago,  pLTCS for breech presentation @35w4d for sPEC 3#12  GynHx: Denies uterine polyps, fibroids, ovarian cysts, no abnml paps or STI/STDs  PMHx: Denies  PSHx: Denies  Med: PNV, bASA, pepcid  All: NKDA  Psych: Denies hx of mental health issues  SH: Denies hx of smoking, drinking, or drug usage during the pregnancy

## 2024-08-30 NOTE — OB PROVIDER H&P - NSHPPHYSICALEXAM_GEN_ALL_CORE
Vital Signs Last 24 Hrs  T(C): 36.7 (30 Aug 2024 19:14), Max: 36.7 (30 Aug 2024 19:01)  T(F): 98.1 (30 Aug 2024 19:06), Max: 98.1 (30 Aug 2024 19:01)  HR: 96 (30 Aug 2024 19:24) (77 - 96)  BP: 127/76 (30 Aug 2024 19:16) (127/76 - 134/89)  BP(mean): --  RR: 18 (30 Aug 2024 19:14) (18 - 18)  SpO2: 98% (30 Aug 2024 19:24) (97% - 100%)    Parameters below as of 30 Aug 2024 19:06  Patient On (Oxygen Delivery Method): room air        SVE: 2/70/-3  FHT: Baseline: 150 , moderate variability, + accels, - decels  [place]: q 2-3min  Sono: Breech, Posterior fundal placenta, KD 5.86  Speculum: +pooling, +nitrazine

## 2024-08-30 NOTE — OB RN TRIAGE NOTE - FALL HARM RISK - UNIVERSAL INTERVENTIONS
Bed in lowest position, wheels locked, appropriate side rails in place/Call bell, personal items and telephone in reach/Instruct patient to call for assistance before getting out of bed or chair/Non-slip footwear when patient is out of bed/[place] to call system/Physically safe environment - no spills, clutter or unnecessary equipment/Purposeful Proactive Rounding/Room/bathroom lighting operational, light cord in reach

## 2024-08-30 NOTE — OB PROVIDER H&P - NSPREVIOUSTERM_OBGYN_ALL_OB
Yes
Pt came from home with c/o sore throat x 2 days. Pt endorses painful swallowing, but able to tolerate po intake. Reports being seen at an urgent care today and was sent to the ER due to tachycardia. On arrival, pt with BP= 175/84, HR= 114.

## 2024-08-30 NOTE — OB PROVIDER H&P - ATTENDING COMMENTS
I have personally seen, examined, and participated in the care of this patient. I have reviewed all pertinent clinical information, including history, physical exam, plan, and the Resident 's note and agree except as noted.    Caesar Crisostomo MD

## 2024-08-30 NOTE — OB PROVIDER DELIVERY SUMMARY - NSSELHIDDEN_OBGYN_ALL_OB_FT
[NS_DeliveryAttending1_OBGYN_ALL_OB_FT:[id number]=],[NS_DeliveryAssist1_OBGYN_ALL_OB_FT:[id number]=],[NS_DeliveryRN_OBGYN_ALL_OB_FT:[id number]=]

## 2024-08-30 NOTE — OB RN PATIENT PROFILE - FALL HARM RISK - UNIVERSAL INTERVENTIONS
Bed in lowest position, wheels locked, appropriate side rails in place/Call bell, personal items and telephone in reach/Instruct patient to call for assistance before getting out of bed or chair/Non-slip footwear when patient is out of bed/[place] to call system/Physically safe environment - no spills, clutter or unnecessary equipment/Purposeful Proactive Rounding/Room/bathroom lighting operational, light cord in reach

## 2024-08-30 NOTE — CHART NOTE - NSCHARTNOTEFT_GEN_A_CORE
Patient seen and evaluated at bedside due to nursing reporting oozing from abdominal dressing. Opsite found to be mildly saturated on the left and right side. Dressing removed and mild bright red blood seen oozing from incision. Dressing replaced using new opsite and additional pressure dressing.     Marci Alvarez PA-C

## 2024-08-30 NOTE — OB PROVIDER DELIVERY SUMMARY - NSPROVIDERDELIVERYNOTE_OBGYN_ALL_OB_FT
Procedure: rLTCS  Preop Dx: prior LTCS, ROM, IUP@38w3d  QBL: 274  IVF:  2L crystalloids  UOP: 1100 ml  Layers of uterine closure: 1, PDS  Complications: none  Specimen: none   Findings: 10cm uterine window at prior hysterotomy site, grossly nl fallopian tubes and ovaries  Hemostatic/Intraoperative agents: Interseed  Baby: M Apgars 9/9, 2925 g    Pt counseled on scheduling a repeat LTCS no later than 37w as would be for prior myomectomy due to uterine window.    Dictation: [id number]    Katie Hull MD  OBGYN PGY2

## 2024-08-30 NOTE — OB RN DELIVERY SUMMARY - NS_SEPSISRSKCALC_OBGYN_ALL_OB_FT
EOS calculated successfully. EOS Risk Factor: 0.5/1000 live births ([facility] national incidence); GA=38w3d; Temp=98.1; ROM=3.333; GBS='Negative'; Antibiotics='No antibiotics or any antibiotics < 2 hrs prior to birth'

## 2024-08-30 NOTE — OB RN DELIVERY SUMMARY - NSSELHIDDEN_OBGYN_ALL_OB_FT
[NS_DeliveryAttending1_OBGYN_ALL_OB_FT:[email or web address]=],[NS_DeliveryAssist1_OBGYN_ALL_OB_FT:[id number]=],[NS_DeliveryRN_OBGYN_ALL_OB_FT:[id number]=]

## 2024-08-30 NOTE — OB RN INTRAOPERATIVE NOTE - NSSELHIDDEN_OBGYN_ALL_OB_FT
[NS_DeliveryAttending1_OBGYN_ALL_OB_FT:[email or web address]=],[NS_DeliveryAssist1_OBGYN_ALL_OB_FT:[id number]=],[NS_DeliveryRN_OBGYN_ALL_OB_FT:[id number]=]

## 2024-08-30 NOTE — OB RN PATIENT PROFILE - FALL HARM RISK - PATIENT NEEDS ASSISTANCE
Please check with the patient to see if she has been taking benazepril as it gives me a warning that she cannot take with head and neck angioedema    No assistance needed

## 2024-08-31 PROBLEM — O14.90 UNSPECIFIED PRE-ECLAMPSIA, UNSPECIFIED TRIMESTER: Chronic | Status: ACTIVE | Noted: 2024-08-21

## 2024-08-31 PROBLEM — O34.211 MATERNAL CARE FOR LOW TRANSVERSE SCAR FROM PREVIOUS CESAREAN DELIVERY: Chronic | Status: ACTIVE | Noted: 2024-08-21

## 2024-08-31 LAB
BASOPHILS # BLD AUTO: 0.03 K/UL — SIGNIFICANT CHANGE UP (ref 0–0.2)
BASOPHILS NFR BLD AUTO: 0.2 % — SIGNIFICANT CHANGE UP (ref 0–2)
EOSINOPHIL # BLD AUTO: 0 K/UL — SIGNIFICANT CHANGE UP (ref 0–0.5)
EOSINOPHIL NFR BLD AUTO: 0 % — SIGNIFICANT CHANGE UP (ref 0–6)
HCT VFR BLD CALC: 31.6 % — LOW (ref 34.5–45)
HGB BLD-MCNC: 10.6 G/DL — LOW (ref 11.5–15.5)
IMM GRANULOCYTES NFR BLD AUTO: 0.6 % — SIGNIFICANT CHANGE UP (ref 0–0.9)
LYMPHOCYTES # BLD AUTO: 1.76 K/UL — SIGNIFICANT CHANGE UP (ref 1–3.3)
LYMPHOCYTES # BLD AUTO: 10.4 % — LOW (ref 13–44)
MCHC RBC-ENTMCNC: 32.5 PG — SIGNIFICANT CHANGE UP (ref 27–34)
MCHC RBC-ENTMCNC: 33.5 GM/DL — SIGNIFICANT CHANGE UP (ref 32–36)
MCV RBC AUTO: 96.9 FL — SIGNIFICANT CHANGE UP (ref 80–100)
MONOCYTES # BLD AUTO: 0.99 K/UL — HIGH (ref 0–0.9)
MONOCYTES NFR BLD AUTO: 5.8 % — SIGNIFICANT CHANGE UP (ref 2–14)
NEUTROPHILS # BLD AUTO: 14.11 K/UL — HIGH (ref 1.8–7.4)
NEUTROPHILS NFR BLD AUTO: 83 % — HIGH (ref 43–77)
NRBC # BLD: 0 /100 WBCS — SIGNIFICANT CHANGE UP (ref 0–0)
PLATELET # BLD AUTO: 197 K/UL — SIGNIFICANT CHANGE UP (ref 150–400)
RBC # BLD: 3.26 M/UL — LOW (ref 3.8–5.2)
RBC # FLD: 12.2 % — SIGNIFICANT CHANGE UP (ref 10.3–14.5)
T PALLIDUM AB TITR SER: NEGATIVE — SIGNIFICANT CHANGE UP
WBC # BLD: 17 K/UL — HIGH (ref 3.8–10.5)
WBC # FLD AUTO: 17 K/UL — HIGH (ref 3.8–10.5)

## 2024-08-31 RX ORDER — OXYCODONE HYDROCHLORIDE 5 MG/1
5 TABLET ORAL ONCE
Refills: 0 | Status: DISCONTINUED | OUTPATIENT
Start: 2024-08-31 | End: 2024-09-02

## 2024-08-31 RX ORDER — HEPARIN SODIUM,BOVINE 1000/ML
5000 VIAL (ML) INJECTION EVERY 12 HOURS
Refills: 0 | Status: DISCONTINUED | OUTPATIENT
Start: 2024-08-31 | End: 2024-09-02

## 2024-08-31 RX ADMIN — ACETAMINOPHEN 975 MILLIGRAM(S): 325 TABLET ORAL at 15:40

## 2024-08-31 RX ADMIN — Medication 5000 UNIT(S): at 09:08

## 2024-08-31 RX ADMIN — ACETAMINOPHEN 975 MILLIGRAM(S): 325 TABLET ORAL at 00:43

## 2024-08-31 RX ADMIN — ACETAMINOPHEN 975 MILLIGRAM(S): 325 TABLET ORAL at 09:08

## 2024-08-31 RX ADMIN — ACETAMINOPHEN 975 MILLIGRAM(S): 325 TABLET ORAL at 21:26

## 2024-08-31 RX ADMIN — ACETAMINOPHEN 975 MILLIGRAM(S): 325 TABLET ORAL at 10:10

## 2024-08-31 RX ADMIN — NALBUPHINE HYDROCHLORIDE 2.5 MILLIGRAM(S): 10 INJECTION, SOLUTION INTRAMUSCULAR; INTRAVENOUS; SUBCUTANEOUS at 13:50

## 2024-08-31 RX ADMIN — KETOROLAC TROMETHAMINE 30 MILLIGRAM(S): 30 INJECTION, SOLUTION INTRAMUSCULAR at 06:49

## 2024-08-31 RX ADMIN — KETOROLAC TROMETHAMINE 30 MILLIGRAM(S): 30 INJECTION, SOLUTION INTRAMUSCULAR at 13:10

## 2024-08-31 RX ADMIN — KETOROLAC TROMETHAMINE 30 MILLIGRAM(S): 30 INJECTION, SOLUTION INTRAMUSCULAR at 12:20

## 2024-08-31 RX ADMIN — KETOROLAC TROMETHAMINE 30 MILLIGRAM(S): 30 INJECTION, SOLUTION INTRAMUSCULAR at 05:35

## 2024-08-31 RX ADMIN — ACETAMINOPHEN 975 MILLIGRAM(S): 325 TABLET ORAL at 14:40

## 2024-08-31 RX ADMIN — KETOROLAC TROMETHAMINE 30 MILLIGRAM(S): 30 INJECTION, SOLUTION INTRAMUSCULAR at 18:31

## 2024-08-31 RX ADMIN — ACETAMINOPHEN 975 MILLIGRAM(S): 325 TABLET ORAL at 20:11

## 2024-08-31 RX ADMIN — ONDANSETRON 4 MILLIGRAM(S): 2 INJECTION, SOLUTION INTRAMUSCULAR; INTRAVENOUS at 02:09

## 2024-08-31 RX ADMIN — KETOROLAC TROMETHAMINE 30 MILLIGRAM(S): 30 INJECTION, SOLUTION INTRAMUSCULAR at 17:34

## 2024-08-31 RX ADMIN — Medication 5000 UNIT(S): at 20:11

## 2024-09-01 RX ORDER — IBUPROFEN 600 MG
600 TABLET ORAL EVERY 6 HOURS
Refills: 0 | Status: DISCONTINUED | OUTPATIENT
Start: 2024-09-01 | End: 2024-09-02

## 2024-09-01 RX ORDER — FAMOTIDINE 10 MG/ML
20 INJECTION INTRAVENOUS DAILY
Refills: 0 | Status: DISCONTINUED | OUTPATIENT
Start: 2024-09-01 | End: 2024-09-02

## 2024-09-01 RX ADMIN — Medication 5000 UNIT(S): at 09:07

## 2024-09-01 RX ADMIN — ACETAMINOPHEN 975 MILLIGRAM(S): 325 TABLET ORAL at 15:03

## 2024-09-01 RX ADMIN — ACETAMINOPHEN 975 MILLIGRAM(S): 325 TABLET ORAL at 20:24

## 2024-09-01 RX ADMIN — ACETAMINOPHEN 975 MILLIGRAM(S): 325 TABLET ORAL at 03:42

## 2024-09-01 RX ADMIN — Medication 600 MILLIGRAM(S): at 13:15

## 2024-09-01 RX ADMIN — FAMOTIDINE 20 MILLIGRAM(S): 10 INJECTION INTRAVENOUS at 12:16

## 2024-09-01 RX ADMIN — Medication 600 MILLIGRAM(S): at 12:16

## 2024-09-01 RX ADMIN — Medication 5000 UNIT(S): at 20:26

## 2024-09-01 RX ADMIN — ACETAMINOPHEN 975 MILLIGRAM(S): 325 TABLET ORAL at 09:06

## 2024-09-01 RX ADMIN — Medication 600 MILLIGRAM(S): at 18:22

## 2024-09-01 RX ADMIN — ACETAMINOPHEN 975 MILLIGRAM(S): 325 TABLET ORAL at 10:05

## 2024-09-01 RX ADMIN — Medication 600 MILLIGRAM(S): at 23:15

## 2024-09-01 RX ADMIN — Medication 80 MILLIGRAM(S): at 20:24

## 2024-09-01 RX ADMIN — Medication 600 MILLIGRAM(S): at 17:59

## 2024-09-01 RX ADMIN — Medication 30 MILLILITER(S): at 00:19

## 2024-09-01 RX ADMIN — ACETAMINOPHEN 975 MILLIGRAM(S): 325 TABLET ORAL at 02:52

## 2024-09-01 RX ADMIN — ACETAMINOPHEN 975 MILLIGRAM(S): 325 TABLET ORAL at 15:27

## 2024-09-01 RX ADMIN — KETOROLAC TROMETHAMINE 30 MILLIGRAM(S): 30 INJECTION, SOLUTION INTRAMUSCULAR at 00:46

## 2024-09-01 RX ADMIN — KETOROLAC TROMETHAMINE 30 MILLIGRAM(S): 30 INJECTION, SOLUTION INTRAMUSCULAR at 00:20

## 2024-09-01 NOTE — PROGRESS NOTE ADULT - PROBLEM SELECTOR PLAN 1
- Continue regular diet.  - Increase ambulation.  - Continue motrin, tylenol, oxycodone PRN for pain control.    - H/H 11.4/34.8->10.6/31.6

## 2024-09-01 NOTE — PROGRESS NOTE ADULT - NS ATTEND AMEND GEN_ALL_CORE FT
POD 1, better now, will try to eat today, and void, no active bleeding, stable labs/vitals
POD 2 doing well

## 2024-09-02 ENCOUNTER — TRANSCRIPTION ENCOUNTER (OUTPATIENT)
Age: 39
End: 2024-09-02

## 2024-09-02 VITALS
RESPIRATION RATE: 18 BRPM | SYSTOLIC BLOOD PRESSURE: 121 MMHG | OXYGEN SATURATION: 98 % | HEART RATE: 80 BPM | DIASTOLIC BLOOD PRESSURE: 79 MMHG | TEMPERATURE: 98 F

## 2024-09-02 PROCEDURE — 59050 FETAL MONITOR W/REPORT: CPT

## 2024-09-02 PROCEDURE — 86900 BLOOD TYPING SEROLOGIC ABO: CPT

## 2024-09-02 PROCEDURE — 85025 COMPLETE CBC W/AUTO DIFF WBC: CPT

## 2024-09-02 PROCEDURE — 59025 FETAL NON-STRESS TEST: CPT

## 2024-09-02 PROCEDURE — 90707 MMR VACCINE SC: CPT

## 2024-09-02 PROCEDURE — 86850 RBC ANTIBODY SCREEN: CPT

## 2024-09-02 PROCEDURE — 86780 TREPONEMA PALLIDUM: CPT

## 2024-09-02 PROCEDURE — C1765: CPT

## 2024-09-02 PROCEDURE — 86901 BLOOD TYPING SEROLOGIC RH(D): CPT

## 2024-09-02 RX ORDER — MEASELS, MUMPS, AND RUBELLA VACCINE, LIVE 3.4-4.2
0.5 KIT SUBCUTANEOUS ONCE
Refills: 0 | Status: COMPLETED | OUTPATIENT
Start: 2024-09-02 | End: 2024-09-02

## 2024-09-02 RX ORDER — IBUPROFEN 600 MG
1 TABLET ORAL
Qty: 0 | Refills: 0 | DISCHARGE
Start: 2024-09-02

## 2024-09-02 RX ADMIN — Medication 5000 UNIT(S): at 08:22

## 2024-09-02 RX ADMIN — FAMOTIDINE 20 MILLIGRAM(S): 10 INJECTION INTRAVENOUS at 11:51

## 2024-09-02 RX ADMIN — Medication 600 MILLIGRAM(S): at 05:32

## 2024-09-02 RX ADMIN — MEASELS, MUMPS, AND RUBELLA VACCINE, LIVE 0.5 MILLILITER(S): KIT at 11:51

## 2024-09-02 RX ADMIN — ACETAMINOPHEN 975 MILLIGRAM(S): 325 TABLET ORAL at 08:21

## 2024-09-02 RX ADMIN — Medication 600 MILLIGRAM(S): at 11:51

## 2024-09-02 RX ADMIN — ACETAMINOPHEN 975 MILLIGRAM(S): 325 TABLET ORAL at 09:14

## 2024-09-02 RX ADMIN — ACETAMINOPHEN 975 MILLIGRAM(S): 325 TABLET ORAL at 03:13

## 2024-09-02 NOTE — PROGRESS NOTE ADULT - SUBJECTIVE AND OBJECTIVE BOX
OB Progress Note:  Delivery, POD#1    S: 40yo POD#1 s/p LTCS . Her pain is well controlled. Pt is tolerating fluids but had nausea last night so has not eaten. Christiansen came out at 5am so patient has not voided yet. Has not yet been OOB. Passing flatus. Denies CP/SOB/lightheadedness/dizziness.     O:   Vital Signs Last 24 Hrs  T(C): 36.9 (31 Aug 2024 05:46), Max: 37.1 (31 Aug 2024 01:51)  T(F): 98.4 (31 Aug 2024 05:46), Max: 98.7 (31 Aug 2024 01:51)  HR: 61 (31 Aug 2024 05:46) (61 - 104)  BP: 114/71 (31 Aug 2024 05:46) (114/71 - 137/77)  BP(mean): 101 (31 Aug 2024 00:45) (82 - 101)  RR: 18 (31 Aug 2024 05:46) (16 - 18)  SpO2: 97% (31 Aug 2024 05:46) (93% - 100%)    Parameters below as of 31 Aug 2024 05:46  Patient On (Oxygen Delivery Method): room air        Labs:  Blood type: A Positive  Rubella IgG: RPR:                           10.6<L>   17.00<H> >-----------< 197    (  @ 06:59 )             31.6<L>                        11.4<L>   9.77 >-----------< 218    (  @ 19:48 )             34.8                  PE:  General: NAD  Abdomen: Mildly distended, appropriately tender, incision c/d/i.  Extremities: No erythema, no pitting edema  
Day 1 of Anesthesia Pain Management Service    SUBJECTIVE:  Pain Scale Score:          [X] Refer to charted pain scores    THERAPY:    s/p 0.1 mg PF morphine    MEDICATIONS  (STANDING):  acetaminophen     Tablet .. 975 milliGRAM(s) Oral <User Schedule>  diphtheria/tetanus/pertussis (acellular) Vaccine (Adacel) 0.5 milliLiter(s) IntraMuscular once  heparin   Injectable 5000 Unit(s) SubCutaneous every 12 hours  ibuprofen  Tablet. 600 milliGRAM(s) Oral every 6 hours  ketorolac   Injectable 30 milliGRAM(s) IV Push every 6 hours  lactated ringers. 1000 milliLiter(s) (125 mL/Hr) IV Continuous <Continuous>  lactated ringers. 1000 milliLiter(s) (200 mL/Hr) IV Continuous <Continuous>  morphine PF Spinal 0.1 milliGRAM(s) IntraThecal. once  oxytocin Infusion 333.333 milliUNIT(s)/Min (1000 mL/Hr) IV Continuous <Continuous>  oxytocin Infusion 333.333 milliUNIT(s)/Min (1000 mL/Hr) IV Continuous <Continuous>    MEDICATIONS  (PRN):  dexAMETHasone  Injectable 4 milliGRAM(s) IV Push every 6 hours PRN Nausea  diphenhydrAMINE 25 milliGRAM(s) Oral every 6 hours PRN Pruritus  lanolin Ointment 1 Application(s) Topical every 6 hours PRN Sore Nipples  magnesium hydroxide Suspension 30 milliLiter(s) Oral two times a day PRN Constipation  nalbuphine Injectable 2.5 milliGRAM(s) IV Push every 6 hours PRN Pruritus  naloxone Injectable 0.1 milliGRAM(s) IV Push every 3 minutes PRN For ANY of the following changes in patient status:  A. Breaths Per Minute LESS THAN 10, B. Oxygen saturation LESS THAN 90%, C. Sedation score of 6 for Stop After: 4 Times  ondansetron Injectable 4 milliGRAM(s) IV Push every 6 hours PRN Nausea  oxyCODONE    IR 5 milliGRAM(s) Oral every 3 hours PRN Moderate to Severe Pain (4-10)  oxyCODONE    IR 5 milliGRAM(s) Oral every 3 hours PRN Mild Pain (1 - 3)  oxyCODONE    IR 5 milliGRAM(s) Oral once PRN Moderate to Severe Pain (4-10)  simethicone 80 milliGRAM(s) Chew every 4 hours PRN Gas      OBJECTIVE:    Sedation:        	[X] Alert	[ ] Drowsy	[ ] Arousable      [ ] Asleep       [ ] Unresponsive    Side Effects:	[X] None	[ ] Nausea	[ ] Vomiting         [ ] Pruritus  		[ ] Weakness            [ ] Numbness	          [ ] Other:    Vital Signs Last 24 Hrs  T(C): 37 (31 Aug 2024 09:00), Max: 37.1 (31 Aug 2024 01:51)  T(F): 98.6 (31 Aug 2024 09:00), Max: 98.7 (31 Aug 2024 01:51)  HR: 60 (31 Aug 2024 09:00) (60 - 104)  BP: 100/64 (31 Aug 2024 09:00) (100/64 - 137/77)  BP(mean): 101 (31 Aug 2024 00:45) (82 - 101)  RR: 18 (31 Aug 2024 09:00) (16 - 18)  SpO2: 96% (31 Aug 2024 09:00) (93% - 100%)    Parameters below as of 31 Aug 2024 09:00  Patient On (Oxygen Delivery Method): room air        ASSESSMENT/ PLAN  [X] Patient transitioned to prn analgesics  [X] Pain management per primary service, pain service to sign off   [X]Documentation and Verification of current medications
OB Postpartum Note: Repeat  Delivery, POD#3     S: 38yo  POD#3 s/p rLTCS. Her pain is well controlled. She is tolerating a regular diet and passing flatus, though endorsing gas pain. Nausea resolved spontaneously. Ambulating without difficulty. Voiding spontaneously. Endorsing abdominal pain improved with pepcid. Endorsing loose stools.   O:   Vitals:  Vital Signs Last 24 Hrs  T(C): 36.9 (02 Sep 2024 05:28), Max: 36.9 (01 Sep 2024 20:37)  T(F): 98.4 (02 Sep 2024 05:28), Max: 98.4 (01 Sep 2024 20:37)  HR: 80 (02 Sep 2024 05:28) (66 - 80)  BP: 121/79 (02 Sep 2024 05:28) (99/66 - 121/79)  BP(mean): --  RR: 18 (02 Sep 2024 05:28) (18 - 18)  SpO2: 98% (02 Sep 2024 05:28) (97% - 98%)    Parameters below as of 02 Sep 2024 05:28  Patient On (Oxygen Delivery Method): room air        MEDICATIONS  (STANDING):  acetaminophen     Tablet .. 975 milliGRAM(s) Oral <User Schedule>  diphtheria/tetanus/pertussis (acellular) Vaccine (Adacel) 0.5 milliLiter(s) IntraMuscular once  famotidine    Tablet 20 milliGRAM(s) Oral daily  heparin   Injectable 5000 Unit(s) SubCutaneous every 12 hours  ibuprofen  Tablet. 600 milliGRAM(s) Oral every 6 hours  lactated ringers. 1000 milliLiter(s) (125 mL/Hr) IV Continuous <Continuous>  lactated ringers. 1000 milliLiter(s) (200 mL/Hr) IV Continuous <Continuous>  oxytocin Infusion 333.333 milliUNIT(s)/Min (1000 mL/Hr) IV Continuous <Continuous>  oxytocin Infusion 333.333 milliUNIT(s)/Min (1000 mL/Hr) IV Continuous <Continuous>    MEDICATIONS  (PRN):  diphenhydrAMINE 25 milliGRAM(s) Oral every 6 hours PRN Pruritus  lanolin Ointment 1 Application(s) Topical every 6 hours PRN Sore Nipples  magnesium hydroxide Suspension 30 milliLiter(s) Oral two times a day PRN Constipation  oxyCODONE    IR 5 milliGRAM(s) Oral once PRN Moderate to Severe Pain (4-10)  oxyCODONE    IR 5 milliGRAM(s) Oral once PRN Moderate to Severe Pain (4-10)  oxyCODONE    IR 5 milliGRAM(s) Oral every 3 hours PRN Moderate to Severe Pain (4-10)  simethicone 80 milliGRAM(s) Chew every 4 hours PRN Gas      Labs:  Blood type: A Positive  Rubella IgG: RPR: Negative                          10.6<L>   17.00<H> >-----------< 197    (  @ 06:59 )             31.6<L>                        11.4<L>   9.77 >-----------< 218    (  @ 19:48 )             34.8                  PE:  General: NAD  Abdomen: fundus firm, incision intact with steris, serosanguinous strikethrough without active wound bleeding  Extremities: No calf pain to palpation    
OB Progress Note:  Delivery, POD#2    S: 38yo POD#1 s/p LTCS . Her pain is well controlled. Pt is tolerating PO and is OOB. Voiding and passing flatus. Denies CP/SOB/lightheadedness/dizziness.     O:     Vital Signs Last 24 Hrs  T(C): 36.6 (01 Sep 2024 05:29), Max: 37 (31 Aug 2024 09:00)  T(F): 97.9 (01 Sep 2024 05:), Max: 98.6 (31 Aug 2024 09:00)  HR: 64 (01 Sep 2024 05:) (56 - 64)  BP: 116/75 (01 Sep 2024 05:) (93/58 - 116/75)  BP(mean): --  RR: 18 (01 Sep 2024 05:) (18 - 18)  SpO2: 98% (01 Sep 2024 05:) (96% - 98%)    Parameters below as of 01 Sep 2024 05:29  Patient On (Oxygen Delivery Method): room air            Labs:  Blood type: A Positive  Rubella IgG: RPR:                           10.6<L>   17.00<H> >-----------< 197    (  @ 06:59 )             31.6<L>                        11.4<L>   9.77 >-----------< 218    (  @ 19:48 )             34.8      PE:  General: NAD  Abdomen: Mildly distended, appropriately tender, incision c/d/i.  Extremities: No erythema, no pitting edema

## 2024-09-02 NOTE — PROGRESS NOTE ADULT - ATTENDING COMMENTS
Doing well  VSS afeb  Abd S NT ND FF min lochia  inc c/d/i  S/P C/S stable for dc home  Instruc reviewed   FU 2 & 6 wks

## 2024-09-02 NOTE — DISCHARGE NOTE OB - CARE PROVIDER_API CALL
Jack ordered.  Thank you.   Florian Shelby  Obstetrics and Gynecology  [address] [facility] [address]  [place] [address]  Phone: [phone]  Fax: [phone]  Follow Up Time:    Banner Transposition Flap Text: The defect edges were debeveled with a #15 scalpel blade.  Given the location of the defect and the proximity to free margins a Banner transposition flap was deemed most appropriate.  Using a sterile surgical marker, an appropriate flap drawn around the defect. The area thus outlined was incised deep to adipose tissue with a #15 scalpel blade.  The skin margins were undermined to an appropriate distance in all directions utilizing iris scissors.

## 2024-09-02 NOTE — PROGRESS NOTE ADULT - ASSESSMENT
A/P: 40yo  POD#1 s/p rLTCS.  Patient is stable and doing well post-operatively.    - Continue regular diet  - Increase ambulation.  - Pepcid PRN, simethicone PRN  - Continue motrin, tylenol, oxycodone PRN for pain control.     Daniel Renae PGY1  
A/P: 38yo POD#1 s/p LTCS.  Patient is stable and doing well post-operatively.    - Continue regular diet.  - Increase ambulation.  - Continue motrin, tylenol, oxycodone PRN for pain control.    - H/H 11.4/34.8->10.6/31.6    Teresita Kumar, PGY1  
A/P: 40yo POD#2 s/p LTCS.  Patient is stable and doing well post-operatively.

## 2024-09-02 NOTE — DISCHARGE NOTE OB - PATIENT PORTAL LINK FT
You can access the FollowMyHealth Patient Portal offered by [facility] by registering at the following website: [email or web address]. By joining [facility]’s FollowMyHealth portal, you will also be able to view your health information using other applications (apps) compatible with our system.

## 2024-11-04 NOTE — DISCHARGE NOTE OB - SEVERE ABDOMINAL/VAGINAL AND/OR RECTAL PAIN
-- DO NOT REPLY / DO NOT REPLY ALL --  -- This inbox is not monitored. If this was sent to the wrong provider or department, reroute message to P [facility] Reroute pool. --  -- Message is from [facility] Operations ([facility]) --      Message Type:  Refill Medication   Refill request for medication named: oxyCODONE, IMM REL, (ROXICODONE) 5 MG immediate release tablet   Medication not pended:  will not let writer pend medication   Preferred pharmacy verified and selected.        Is the patient OUT of Medication?  Yes and During Work Hours Medication Refills handled by [facility] Clinical - route as high priority to [facility] CLINICAL MSG pool. Patient has been advised it will be addressed within 1 business day.     Message:     Patient has been advised the message will be addressed within 2-3 business days.             Statement Selected